# Patient Record
Sex: MALE | Race: WHITE | HISPANIC OR LATINO | URBAN - METROPOLITAN AREA
[De-identification: names, ages, dates, MRNs, and addresses within clinical notes are randomized per-mention and may not be internally consistent; named-entity substitution may affect disease eponyms.]

---

## 2019-01-24 ENCOUNTER — EMERGENCY (EMERGENCY)
Facility: HOSPITAL | Age: 39
LOS: 1 days | Discharge: HOME | End: 2019-01-24
Attending: EMERGENCY MEDICINE
Payer: COMMERCIAL

## 2019-01-24 VITALS — TEMPERATURE: 99 F

## 2019-01-24 VITALS
RESPIRATION RATE: 18 BRPM | DIASTOLIC BLOOD PRESSURE: 80 MMHG | TEMPERATURE: 98 F | HEART RATE: 87 BPM | SYSTOLIC BLOOD PRESSURE: 130 MMHG | WEIGHT: 240.08 LBS | OXYGEN SATURATION: 98 % | HEIGHT: 69 IN

## 2019-01-24 DIAGNOSIS — M77.9 ENTHESOPATHY, UNSPECIFIED: ICD-10-CM

## 2019-01-24 DIAGNOSIS — K80.20 CALCULUS OF GALLBLADDER WITHOUT CHOLECYSTITIS WITHOUT OBSTRUCTION: Chronic | ICD-10-CM

## 2019-01-24 LAB
ALBUMIN SERPL ELPH-MCNC: 4.1 G/DL — SIGNIFICANT CHANGE UP (ref 3.3–5)
ALP SERPL-CCNC: 89 U/L — SIGNIFICANT CHANGE UP (ref 40–120)
ALT FLD-CCNC: 19 U/L — SIGNIFICANT CHANGE UP (ref 10–45)
ANION GAP SERPL CALC-SCNC: 12 MMOL/L — SIGNIFICANT CHANGE UP (ref 5–17)
AST SERPL-CCNC: 13 U/L — SIGNIFICANT CHANGE UP (ref 10–40)
BASOPHILS # BLD AUTO: 0 K/UL — SIGNIFICANT CHANGE UP (ref 0–0.2)
BASOPHILS NFR BLD AUTO: 0.5 % — SIGNIFICANT CHANGE UP (ref 0–2)
BILIRUB SERPL-MCNC: 0.3 MG/DL — SIGNIFICANT CHANGE UP (ref 0.2–1.2)
BUN SERPL-MCNC: 15 MG/DL — SIGNIFICANT CHANGE UP (ref 7–23)
CALCIUM SERPL-MCNC: 9 MG/DL — SIGNIFICANT CHANGE UP (ref 8.4–10.5)
CHLORIDE SERPL-SCNC: 106 MMOL/L — SIGNIFICANT CHANGE UP (ref 96–108)
CO2 SERPL-SCNC: 26 MMOL/L — SIGNIFICANT CHANGE UP (ref 22–31)
CREAT SERPL-MCNC: 1.04 MG/DL — SIGNIFICANT CHANGE UP (ref 0.5–1.3)
EOSINOPHIL # BLD AUTO: 0.1 K/UL — SIGNIFICANT CHANGE UP (ref 0–0.5)
EOSINOPHIL NFR BLD AUTO: 1.3 % — SIGNIFICANT CHANGE UP (ref 0–6)
GLUCOSE SERPL-MCNC: 98 MG/DL — SIGNIFICANT CHANGE UP (ref 70–99)
HCT VFR BLD CALC: 40.8 % — SIGNIFICANT CHANGE UP (ref 39–50)
HGB BLD-MCNC: 14.1 G/DL — SIGNIFICANT CHANGE UP (ref 13–17)
LYMPHOCYTES # BLD AUTO: 1.7 K/UL — SIGNIFICANT CHANGE UP (ref 1–3.3)
LYMPHOCYTES # BLD AUTO: 17.5 % — SIGNIFICANT CHANGE UP (ref 13–44)
MCHC RBC-ENTMCNC: 31.2 PG — SIGNIFICANT CHANGE UP (ref 27–34)
MCHC RBC-ENTMCNC: 34.5 GM/DL — SIGNIFICANT CHANGE UP (ref 32–36)
MCV RBC AUTO: 90.3 FL — SIGNIFICANT CHANGE UP (ref 80–100)
MONOCYTES # BLD AUTO: 0.8 K/UL — SIGNIFICANT CHANGE UP (ref 0–0.9)
MONOCYTES NFR BLD AUTO: 8.8 % — SIGNIFICANT CHANGE UP (ref 2–14)
NEUTROPHILS # BLD AUTO: 6.9 K/UL — SIGNIFICANT CHANGE UP (ref 1.8–7.4)
NEUTROPHILS NFR BLD AUTO: 72 % — SIGNIFICANT CHANGE UP (ref 43–77)
PLATELET # BLD AUTO: 213 K/UL — SIGNIFICANT CHANGE UP (ref 150–400)
POTASSIUM SERPL-MCNC: 3.9 MMOL/L — SIGNIFICANT CHANGE UP (ref 3.5–5.3)
POTASSIUM SERPL-SCNC: 3.9 MMOL/L — SIGNIFICANT CHANGE UP (ref 3.5–5.3)
PROT SERPL-MCNC: 7.2 G/DL — SIGNIFICANT CHANGE UP (ref 6–8.3)
RBC # BLD: 4.52 M/UL — SIGNIFICANT CHANGE UP (ref 4.2–5.8)
RBC # FLD: 13 % — SIGNIFICANT CHANGE UP (ref 10.3–14.5)
SODIUM SERPL-SCNC: 144 MMOL/L — SIGNIFICANT CHANGE UP (ref 135–145)
WBC # BLD: 9.6 K/UL — SIGNIFICANT CHANGE UP (ref 3.8–10.5)
WBC # FLD AUTO: 9.6 K/UL — SIGNIFICANT CHANGE UP (ref 3.8–10.5)

## 2019-01-24 PROCEDURE — 99284 EMERGENCY DEPT VISIT MOD MDM: CPT | Mod: 25

## 2019-01-24 PROCEDURE — 70491 CT SOFT TISSUE NECK W/DYE: CPT

## 2019-01-24 PROCEDURE — 99284 EMERGENCY DEPT VISIT MOD MDM: CPT

## 2019-01-24 PROCEDURE — 96374 THER/PROPH/DIAG INJ IV PUSH: CPT | Mod: XU

## 2019-01-24 PROCEDURE — 80053 COMPREHEN METABOLIC PANEL: CPT

## 2019-01-24 PROCEDURE — 85027 COMPLETE CBC AUTOMATED: CPT

## 2019-01-24 PROCEDURE — 70491 CT SOFT TISSUE NECK W/DYE: CPT | Mod: 26

## 2019-01-24 RX ORDER — DEXAMETHASONE 0.5 MG/5ML
10 ELIXIR ORAL ONCE
Qty: 0 | Refills: 0 | Status: COMPLETED | OUTPATIENT
Start: 2019-01-24 | End: 2019-01-24

## 2019-01-24 RX ORDER — DIAZEPAM 5 MG
5 TABLET ORAL ONCE
Qty: 0 | Refills: 0 | Status: DISCONTINUED | OUTPATIENT
Start: 2019-01-24 | End: 2019-01-24

## 2019-01-24 RX ORDER — IBUPROFEN 200 MG
1 TABLET ORAL
Qty: 20 | Refills: 0
Start: 2019-01-24 | End: 2019-01-28

## 2019-01-24 RX ORDER — IBUPROFEN 200 MG
600 TABLET ORAL ONCE
Qty: 0 | Refills: 0 | Status: COMPLETED | OUTPATIENT
Start: 2019-01-24 | End: 2019-01-24

## 2019-01-24 RX ORDER — OXYCODONE HYDROCHLORIDE 5 MG/1
5 TABLET ORAL ONCE
Qty: 0 | Refills: 0 | Status: DISCONTINUED | OUTPATIENT
Start: 2019-01-24 | End: 2019-01-24

## 2019-01-24 RX ADMIN — Medication 600 MILLIGRAM(S): at 16:15

## 2019-01-24 RX ADMIN — Medication 102 MILLIGRAM(S): at 21:17

## 2019-01-24 RX ADMIN — Medication 600 MILLIGRAM(S): at 15:45

## 2019-01-24 RX ADMIN — OXYCODONE HYDROCHLORIDE 5 MILLIGRAM(S): 5 TABLET ORAL at 17:53

## 2019-01-24 RX ADMIN — Medication 5 MILLIGRAM(S): at 15:45

## 2019-01-24 NOTE — ED ADULT NURSE NOTE - OBJECTIVE STATEMENT
37 yo presents to the ED from home. A&Ox4, ambulatory c/o left sided neck pain associated with pain with swallowing x 3 days. Per patient he began to have left posterior neck pain 3 days ago. pain started after working all day and contributed pain to having bumpy car ride for work. Pain worsened following day and pt saw PCP and was prescribed Motrin 600 q 12 and flexeril 1 tab before bed. Patient states pain was unrelieved and began to have pain with swallowing so saw pcp again today who advised patient to come to ED for further eval. Patient denies fevers, chills, headaches, cold/cough symptoms, chest pain, sob, abdominal pain, n/v/d, urinary symptoms, paresthesias, weakness. VSS. pt well appearing.

## 2019-01-24 NOTE — ED PROVIDER NOTE - PHYSICAL EXAMINATION
CONSTITUTIONAL: Patient is awake, alert and oriented x 3. Patient is well appearing and in no acute distress.  HEAD: NCAT,   EYES: PERRL b/l, EOMI,   ENT: Airway patent, Nasal mucosa clear. Mouth with normal mucosa. Throat has no vesicles, no oropharyngeal exudates and uvula is midline.  NECK: Skin is without rash or lesions. TTP over the left paracervical region onto base of skull, no midline cervical or right para-cervical ttp, no left trapezius ttp. No obvious LAD or ttp in the anterior neck   HEART: RRR.+S1S2 no murmurs,   ABDOMEN: Soft nd/nt+bs no rebound or guarding.   EXTREMITY: no edema or calf tenderness b/l, FROM upper and lower ext b/l  NEURO: Cn3-12 grossly intact. Strength5/5UE/LE.NmlSensation.Gait normal.

## 2019-01-24 NOTE — CONSULT NOTE ADULT - ATTENDING COMMENTS
Patient seen and examined fully by myself, laryngoscopy/endoscopy performed by myself.    A/P: Retropharyngeal Edema probably inflammatory secondary to tendinitis       -decadron 10mg IV x 1        -home with steroid taper and NSAIDS for pain       -f/u with ENT clinic as outpatient [phone]

## 2019-01-24 NOTE — CONSULT NOTE ADULT - ASSESSMENT
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 Pt was started on a muscle relaxant yesterday and motrin and did not find relief. CT reveals Calcific tendinitis of the longus colli muscle. Small amount of prevertebral fluid without peripheral enhancement, likely related to the presence of calcific tendinitis. Laryngoscopy normal, airway widely patent. No concern for infectious process as pt is afebrile and WBC WNL.

## 2019-01-24 NOTE — ED PROVIDER NOTE - PROGRESS NOTE DETAILS
Pt pmd Roque Aden sent pt w/ note to rule out neck mass/abscess w/ ct soft tissue neck given pain unrelieved with nsaid/ muscle relaxer and atypical for muscle spasm given dysphagia   Heidi Huang PA-C CT resulted with calcific tendonitis of the luis antonio longus muscle, spoke to ENT PA who will discuss amd review images to see if diagnosis is in their realm of treatment   Heidi Huang PA-C pt evaluated at bedside by PANCHITO strong. he performed a scope. per dr strong, he does not think this is infectious. he recommends 1 dose IV steroids followed by steroid taper, along with nsaids. pt updated on findings, return precautions, and f/u instructions. to be discharged. - Gilberto Mullen MD

## 2019-01-24 NOTE — ED PROVIDER NOTE - MEDICAL DECISION MAKING DETAILS
Michele: 38 year old male with left sided nedck pain associated with swallowing. was on muscle relaxants and no improvement. pain with opening jaw. + neck spasm on exam, oropharnyx wnl, no swelling of under tongue. will get labs, ct soft tissue, pain control, reassess. no airway compromise. trachea midline. no signs of ludgwig on exam. will ct to r/o retropharyngeal pathology.

## 2019-01-24 NOTE — ED PROVIDER NOTE - CARE PROVIDER_API CALL
Franky Eastman), Otolaryngology  [address] [place] [address]  Phone: [phone]  Fax: [phone]

## 2019-01-24 NOTE — CONSULT NOTE ADULT - PROBLEM SELECTOR RECOMMENDATION 9
1 dose of IV decadron in house  Pt to go home with Steroid taper  Pain control prn  Soft diet as tolerated  Rest  Pt to f/u w PCP

## 2019-01-24 NOTE — ED PROVIDER NOTE - NSFOLLOWUPINSTRUCTIONS_ED_ALL_ED_FT
1. Follow up with your primary care doctor in the next 2-3 days. Bring copies of reports with you  2. Take medrol dose pack as prescribed as well as Motrin 600mg every 6 hours for pain  3. For persistent pain with swallowing follow up with ENT Dr. Eastman, see above  4. Return to ED for change of symptoms including increased pain, worsened issues with swallowing, shortness of breath, fevers and any other symptoms of concern

## 2019-01-24 NOTE — ED PROVIDER NOTE - OBJECTIVE STATEMENT
38 year old male with no sig pmhx presents to ED c/o left sided neck pain associated with pain with swallowing x 3 days. Per patient he began to have left posterior neck pain 3 days ago. pain started after working all day and contributed pain to having bumpy car ride for work. Pain worsened following day and pt saw PCP and was prescribed motrin 600 q 12 and flexeril 1 tab before bed. Patient states pain was unrelieved and began to have pain with swallowing so saw pcp again today who advised patient to come to ED for further eval. Patient denies fevers, chills, headaches, cold/cough symptoms, chest pain, sob, abdominal pain, n/v/d, urinary symptoms, paresthesias, weakness

## 2019-01-24 NOTE — ED ADULT NURSE NOTE - NSIMPLEMENTINTERV_GEN_ALL_ED
Implemented All Universal Safety Interventions:  [place] to call system. Call bell, personal items and telephone within reach. Instruct patient to call for assistance. Room bathroom lighting operational. Non-slip footwear when patient is off stretcher. Physically safe environment: no spills, clutter or unnecessary equipment. Stretcher in lowest position, wheels locked, appropriate side rails in place.

## 2019-01-24 NOTE — CONSULT NOTE ADULT - SUBJECTIVE AND OBJECTIVE BOX
CC: Neck pain    HPI: 38 year old male with no sig pmhx presents to ED c/o left sided neck pain associated with pain with swallowing x 3 days. Per patient he began to have left posterior neck pain 3 days ago. Pain started after working all day and mentions having bumpy car ride for work. Pain worsened following day and pt saw PCP and was prescribed motrin 600 q 12 and flexeril 1 tab before bed. Patient states pain was unrelieved and began to have pain with swallowing so saw pcp again today who advised patient to come to ED for further eval. Patient denies fevers, chills, headaches, cold/cough symptoms, sore throat,  chest pain, sob, n/v/d, urinary symptoms, paresthesias, weakness, vision changes. Pt afebrile. Normal WBC        PAST MEDICAL & SURGICAL HISTORY:  Renal colic  Gall bladder stones    Allergies    No Known Allergies    Intolerances      MEDICATIONS  (STANDING):  dexamethasone  IVPB 10 milliGRAM(s) IV Intermittent Once    MEDICATIONS  (PRN):      Social History: unknown if ever smoked, works as a     Family history:   No pertinent family history in first degree relatives      ROS:   ENT: all negative except as noted in HPI   Skin: No itching, dryness, rash, changes to hair, or skin masses  CV: denies palpitations  Pulm: denies SOB, cough, hemoptysis  GI: denies change in appetite, indigestion, n/v  : denies pertinent urinary symptoms, urgency  Neuro: denies numbness/tingling, loss of sensation  Psych: denies anxiety  MS: denies muscle weakness, instability  Heme: denies easy bruising or bleeding  Endo: denies heat/cold intolerance, excessive sweating  Vascular: denies LE edema    Vital Signs Last 24 Hrs  T(C): 37 (24 Jan 2019 18:56), Max: 37.6 (24 Jan 2019 18:00)  T(F): 98.6 (24 Jan 2019 18:56), Max: 99.6 (24 Jan 2019 18:00)  HR: 78 (24 Jan 2019 18:00) (78 - 87)  BP: 135/93 (24 Jan 2019 18:00) (130/80 - 135/93)  BP(mean): --  RR: 18 (24 Jan 2019 18:00) (18 - 18)  SpO2: 97% (24 Jan 2019 18:00) (97% - 98%)                          14.1   9.6   )-----------( 213      ( 24 Jan 2019 15:43 )             40.8    01-24    144  |  106  |  15  ----------------------------<  98  3.9   |  26  |  1.04    Ca    9.0      24 Jan 2019 15:43    TPro  7.2  /  Alb  4.1  /  TBili  0.3  /  DBili  x   /  AST  13  /  ALT  19  /  AlkPhos  89  01-24       PHYSICAL EXAM:  Gen: NAD  Skin: No rashes, bruises, or lesions  Head: Normocephalic, Atraumatic  Face: no edema, erythema, or fluctuance. Parotid glands soft without mass  Eyes: no scleral injection  Nose: Nares bilaterally patent, no discharge  Mouth: No Stridor / Drooling / Trismus.  Mucosa moist, tongue/uvula midline, oropharynx clear  Neck: Flat, supple, no lymphadenopathy, trachea midline, no masses, + pain with full ROM  Lymphatic: No lymphadenopathy  Resp: breathing easily, no stridor  CV: no peripheral edema/cyanosis  GI: nondistended   Peripheral vascular: no JVD or edema  Neuro: facial nerve intact, no facial droop      Fiberoptic Indirect laryngoscopy:  (Scope #2 used)  Reason for Laryngoscopy: posterior neck pain    Patient was unable to cooperate with mirror.  Nasopharynx, oropharynx, and hypopharynx clear, no bleeding. Tongue base, posterior pharyngeal wall, vallecula, epiglottis, and subglottis appear normal. No erythema, edema, pooling of secretions, masses or lesions. Airway patent, no foreign body visualized. No glottic/supraglottic edema. True vocal cords, arytenoids, vestibular folds, ventricles, pyriform sinuses, and aryepiglottic folds appear normal bilaterally. Vocal cords mobile with good contact b/l.              IMAGING/ADDITIONAL STUDIES:   EXAM: CT NECK SOFT TISSUE IC       PROCEDURE DATE: 01/24/2019           INTERPRETATION: CLINICAL INFORMATION: Posterior neck pain, difficulty   swallowing.     TECHNIQUE: Axial images were obtained following the intravenous   administration of contrast material on 1/24/2019. Sagittal and coronal   reformatted images were obtained. 90 cc Omnipaque 350 were administered. 10   cc were discarded.     COMPARISON: None.     FINDINGS:   Globular calcified deposits are noted anterior to the odontoid process and   anterior arch of C1, consistent with hydroxyapatite deposition disease.   Small amount of prevertebral fluid tracking between the levels of C2 and C5   without peripheral enhancement or fat stranding.     The aerodigestive tract is grossly within normal limits. No tonsillar   enlargement or retropharyngeal fluid collection.     No cervical lymphadenopathy. The parotid and submandibular glands are within   normal limits. The thyroid gland is normal. The visualized paranasal sinuses   and mastoid air cells are clear. The visualized upper lung fields are clear.     The cervical spine demonstrates minimal degenerative spondylosis without   significant disc height loss.     IMPRESSION:   Calcific tendinitis of the longus colli muscle. Small amount of prevertebral   fluid without peripheral enhancement, likely related to the presence of   calcific tendinitis.     No retropharyngeal or peritonsillar abscess.     CRIS JACKSON M.D., RADIOLOGY RESIDENT   This document has been electronically signed.   SILVA HAIDER M.D., ATTENDING RADIOLOGIST   This document has been electronically signed. Jan 24 2019 6:07PM CC: Neck pain    HPI: 38 year old male with no sig pmhx presents to ED c/o left sided neck pain associated with pain with swallowing x 3 days. Per patient he began to have left posterior neck pain 3 days ago. Pain started after working all day and mentions having bumpy car ride for work. Pain worsened following day and pt saw PCP and was prescribed motrin 600 q 12 and flexeril 1 tab before bed. Patient states pain was unrelieved and began to have pain with swallowing so saw pcp again today who advised patient to come to ED for further eval. Patient denies fevers, chills, headaches, cold/cough symptoms, sore throat,  chest pain, sob, n/v/d, urinary symptoms, paresthesias, weakness, vision changes. Pt afebrile. Normal WBC        PAST MEDICAL & SURGICAL HISTORY:  Renal colic  Gall bladder stones    Allergies    No Known Allergies    Intolerances      MEDICATIONS  (STANDING):  dexamethasone  IVPB 10 milliGRAM(s) IV Intermittent Once    MEDICATIONS  (PRN):      Social History: unknown if ever smoked, works as a     Family history:   No pertinent family history in first degree relatives      ROS:   ENT: all negative except as noted in HPI   Skin: No itching, dryness, rash, changes to hair, or skin masses  CV: denies palpitations  Pulm: denies SOB, cough, hemoptysis  GI: denies change in appetite, indigestion, n/v  : denies pertinent urinary symptoms, urgency  Neuro: denies numbness/tingling, loss of sensation  Psych: denies anxiety  MS: denies muscle weakness, instability  Heme: denies easy bruising or bleeding  Endo: denies heat/cold intolerance, excessive sweating  Vascular: denies LE edema    Vital Signs Last 24 Hrs  T(C): 37 (24 Jan 2019 18:56), Max: 37.6 (24 Jan 2019 18:00)  T(F): 98.6 (24 Jan 2019 18:56), Max: 99.6 (24 Jan 2019 18:00)  HR: 78 (24 Jan 2019 18:00) (78 - 87)  BP: 135/93 (24 Jan 2019 18:00) (130/80 - 135/93)  BP(mean): --  RR: 18 (24 Jan 2019 18:00) (18 - 18)  SpO2: 97% (24 Jan 2019 18:00) (97% - 98%)                          14.1   9.6   )-----------( 213      ( 24 Jan 2019 15:43 )             40.8    01-24    144  |  106  |  15  ----------------------------<  98  3.9   |  26  |  1.04    Ca    9.0      24 Jan 2019 15:43    TPro  7.2  /  Alb  4.1  /  TBili  0.3  /  DBili  x   /  AST  13  /  ALT  19  /  AlkPhos  89  01-24       PHYSICAL EXAM:  Gen: NAD  Skin: No rashes, bruises, or lesions  Head: Normocephalic, Atraumatic  Face: no edema, erythema, or fluctuance. Parotid glands soft without mass  Eyes: no scleral injection  Nose: Nares bilaterally patent, no discharge  Nasal/Sinus Endoscopy: max sinus with mucoid fluid b/l via inf meatus, ss clear b/l  Mouth: No Stridor / Drooling / Trismus.  Mucosa moist, tongue/uvula midline, oropharynx clear  Neck: Flat, supple, no lymphadenopathy, trachea midline, no masses, + pain with full ROM  Lymphatic: No lymphadenopathy  Resp: breathing easily, no stridor  CV: no peripheral edema/cyanosis  GI: nondistended   Peripheral vascular: no JVD or edema  Neuro: facial nerve intact, no facial droop      Fiberoptic Indirect laryngoscopy:  (Scope #2 used)  Reason for Laryngoscopy: posterior neck pain    Patient was unable to cooperate with mirror.  Nasopharynx, oropharynx, and hypopharynx clear, no bleeding. Tongue base, posterior pharyngeal wall, vallecula, epiglottis, and subglottis appear normal. No erythema, edema, pooling of secretions, masses or lesions. Airway patent, no foreign body visualized. No glottic/supraglottic edema. True vocal cords, arytenoids, vestibular folds, ventricles, pyriform sinuses, and aryepiglottic folds appear normal bilaterally. Vocal cords mobile with good contact b/l.              IMAGING/ADDITIONAL STUDIES:   EXAM: CT NECK SOFT TISSUE IC       PROCEDURE DATE: 01/24/2019           INTERPRETATION: CLINICAL INFORMATION: Posterior neck pain, difficulty   swallowing.     TECHNIQUE: Axial images were obtained following the intravenous   administration of contrast material on 1/24/2019. Sagittal and coronal   reformatted images were obtained. 90 cc Omnipaque 350 were administered. 10   cc were discarded.     COMPARISON: None.     FINDINGS:   Globular calcified deposits are noted anterior to the odontoid process and   anterior arch of C1, consistent with hydroxyapatite deposition disease.   Small amount of prevertebral fluid tracking between the levels of C2 and C5   without peripheral enhancement or fat stranding.     The aerodigestive tract is grossly within normal limits. No tonsillar   enlargement or retropharyngeal fluid collection.     No cervical lymphadenopathy. The parotid and submandibular glands are within   normal limits. The thyroid gland is normal. The visualized paranasal sinuses   and mastoid air cells are clear. The visualized upper lung fields are clear.     The cervical spine demonstrates minimal degenerative spondylosis without   significant disc height loss.     IMPRESSION:   Calcific tendinitis of the longus colli muscle. Small amount of prevertebral   fluid without peripheral enhancement, likely related to the presence of   calcific tendinitis.     No retropharyngeal or peritonsillar abscess.     CRIS JACKSON M.D., RADIOLOGY RESIDENT   This document has been electronically signed.   SILVA HAIDER M.D., ATTENDING RADIOLOGIST   This document has been electronically signed. Jan 24 2019 6:07PM

## 2021-01-05 ENCOUNTER — TRANSCRIPTION ENCOUNTER (OUTPATIENT)
Age: 41
End: 2021-01-05

## 2021-08-03 ENCOUNTER — TRANSCRIPTION ENCOUNTER (OUTPATIENT)
Age: 41
End: 2021-08-03

## 2021-08-12 ENCOUNTER — INPATIENT (INPATIENT)
Facility: HOSPITAL | Age: 41
LOS: 4 days | Discharge: HOME | DRG: 177 | End: 2021-08-17
Attending: INTERNAL MEDICINE | Admitting: INTERNAL MEDICINE
Payer: COMMERCIAL

## 2021-08-12 VITALS
OXYGEN SATURATION: 98 % | DIASTOLIC BLOOD PRESSURE: 75 MMHG | HEIGHT: 69 IN | RESPIRATION RATE: 18 BRPM | SYSTOLIC BLOOD PRESSURE: 108 MMHG | WEIGHT: 240.08 LBS | HEART RATE: 99 BPM | TEMPERATURE: 99 F

## 2021-08-12 DIAGNOSIS — K80.20 CALCULUS OF GALLBLADDER WITHOUT CHOLECYSTITIS WITHOUT OBSTRUCTION: Chronic | ICD-10-CM

## 2021-08-12 DIAGNOSIS — Z20.822 CONTACT WITH AND (SUSPECTED) EXPOSURE TO COVID-19: ICD-10-CM

## 2021-08-12 LAB
ALBUMIN SERPL ELPH-MCNC: 3.7 G/DL — SIGNIFICANT CHANGE UP (ref 3.3–5)
ALP SERPL-CCNC: 224 U/L — HIGH (ref 40–120)
ALT FLD-CCNC: 334 U/L — HIGH (ref 10–45)
ANION GAP SERPL CALC-SCNC: 15 MMOL/L — SIGNIFICANT CHANGE UP (ref 5–17)
AST SERPL-CCNC: 183 U/L — HIGH (ref 10–40)
BASE EXCESS BLDV CALC-SCNC: 4 MMOL/L — HIGH (ref -2–2)
BASOPHILS # BLD AUTO: 0.02 K/UL — SIGNIFICANT CHANGE UP (ref 0–0.2)
BASOPHILS NFR BLD AUTO: 0.4 % — SIGNIFICANT CHANGE UP (ref 0–2)
BILIRUB SERPL-MCNC: 0.5 MG/DL — SIGNIFICANT CHANGE UP (ref 0.2–1.2)
BUN SERPL-MCNC: 13 MG/DL — SIGNIFICANT CHANGE UP (ref 7–23)
CA-I SERPL-SCNC: 1.13 MMOL/L — SIGNIFICANT CHANGE UP (ref 1.12–1.3)
CALCIUM SERPL-MCNC: 9.1 MG/DL — SIGNIFICANT CHANGE UP (ref 8.4–10.5)
CHLORIDE BLDV-SCNC: 104 MMOL/L — SIGNIFICANT CHANGE UP (ref 96–108)
CHLORIDE SERPL-SCNC: 103 MMOL/L — SIGNIFICANT CHANGE UP (ref 96–108)
CO2 BLDV-SCNC: 32 MMOL/L — HIGH (ref 22–30)
CO2 SERPL-SCNC: 25 MMOL/L — SIGNIFICANT CHANGE UP (ref 22–31)
CREAT SERPL-MCNC: 1.13 MG/DL — SIGNIFICANT CHANGE UP (ref 0.5–1.3)
CRP SERPL-MCNC: 69 MG/L — HIGH (ref 0–4)
D DIMER BLD IA.RAPID-MCNC: 383 NG/ML DDU — HIGH
EOSINOPHIL # BLD AUTO: 0 K/UL — SIGNIFICANT CHANGE UP (ref 0–0.5)
EOSINOPHIL NFR BLD AUTO: 0 % — SIGNIFICANT CHANGE UP (ref 0–6)
GAS PNL BLDV: 139 MMOL/L — SIGNIFICANT CHANGE UP (ref 135–145)
GAS PNL BLDV: SIGNIFICANT CHANGE UP
GLUCOSE BLDV-MCNC: 92 MG/DL — SIGNIFICANT CHANGE UP (ref 70–99)
GLUCOSE SERPL-MCNC: 90 MG/DL — SIGNIFICANT CHANGE UP (ref 70–99)
HCO3 BLDV-SCNC: 31 MMOL/L — HIGH (ref 21–29)
HCT VFR BLD CALC: 47.5 % — SIGNIFICANT CHANGE UP (ref 39–50)
HCT VFR BLDA CALC: 49 % — SIGNIFICANT CHANGE UP (ref 39–50)
HGB BLD CALC-MCNC: 16.1 G/DL — SIGNIFICANT CHANGE UP (ref 13–17)
HGB BLD-MCNC: 15.6 G/DL — SIGNIFICANT CHANGE UP (ref 13–17)
IMM GRANULOCYTES NFR BLD AUTO: 1.6 % — HIGH (ref 0–1.5)
LACTATE BLDV-MCNC: 0.9 MMOL/L — SIGNIFICANT CHANGE UP (ref 0.7–2)
LYMPHOCYTES # BLD AUTO: 0.98 K/UL — LOW (ref 1–3.3)
LYMPHOCYTES # BLD AUTO: 19.7 % — SIGNIFICANT CHANGE UP (ref 13–44)
MCHC RBC-ENTMCNC: 30.1 PG — SIGNIFICANT CHANGE UP (ref 27–34)
MCHC RBC-ENTMCNC: 32.8 GM/DL — SIGNIFICANT CHANGE UP (ref 32–36)
MCV RBC AUTO: 91.5 FL — SIGNIFICANT CHANGE UP (ref 80–100)
MONOCYTES # BLD AUTO: 0.52 K/UL — SIGNIFICANT CHANGE UP (ref 0–0.9)
MONOCYTES NFR BLD AUTO: 10.4 % — SIGNIFICANT CHANGE UP (ref 2–14)
NEUTROPHILS # BLD AUTO: 3.38 K/UL — SIGNIFICANT CHANGE UP (ref 1.8–7.4)
NEUTROPHILS NFR BLD AUTO: 67.9 % — SIGNIFICANT CHANGE UP (ref 43–77)
NRBC # BLD: 0 /100 WBCS — SIGNIFICANT CHANGE UP (ref 0–0)
PCO2 BLDV: 56 MMHG — HIGH (ref 35–50)
PH BLDV: 7.36 — SIGNIFICANT CHANGE UP (ref 7.35–7.45)
PLATELET # BLD AUTO: 149 K/UL — LOW (ref 150–400)
PO2 BLDV: 24 MMHG — LOW (ref 25–45)
POTASSIUM BLDV-SCNC: 3.9 MMOL/L — SIGNIFICANT CHANGE UP (ref 3.5–5.3)
POTASSIUM SERPL-MCNC: 4.1 MMOL/L — SIGNIFICANT CHANGE UP (ref 3.5–5.3)
POTASSIUM SERPL-SCNC: 4.1 MMOL/L — SIGNIFICANT CHANGE UP (ref 3.5–5.3)
PROCALCITONIN SERPL-MCNC: 0.11 NG/ML — HIGH (ref 0.02–0.1)
PROT SERPL-MCNC: 6.9 G/DL — SIGNIFICANT CHANGE UP (ref 6–8.3)
RBC # BLD: 5.19 M/UL — SIGNIFICANT CHANGE UP (ref 4.2–5.8)
RBC # FLD: 13.5 % — SIGNIFICANT CHANGE UP (ref 10.3–14.5)
SAO2 % BLDV: 36 % — LOW (ref 67–88)
SODIUM SERPL-SCNC: 143 MMOL/L — SIGNIFICANT CHANGE UP (ref 135–145)
WBC # BLD: 4.98 K/UL — SIGNIFICANT CHANGE UP (ref 3.8–10.5)
WBC # FLD AUTO: 4.98 K/UL — SIGNIFICANT CHANGE UP (ref 3.8–10.5)

## 2021-08-12 PROCEDURE — 99285 EMERGENCY DEPT VISIT HI MDM: CPT

## 2021-08-12 PROCEDURE — 71045 X-RAY EXAM CHEST 1 VIEW: CPT | Mod: 26

## 2021-08-12 RX ORDER — DEXAMETHASONE 0.5 MG/5ML
6 ELIXIR ORAL DAILY
Refills: 0 | Status: DISCONTINUED | OUTPATIENT
Start: 2021-08-12 | End: 2021-08-13

## 2021-08-12 RX ORDER — ACETAMINOPHEN 500 MG
650 TABLET ORAL ONCE
Refills: 0 | Status: COMPLETED | OUTPATIENT
Start: 2021-08-12 | End: 2021-08-12

## 2021-08-12 RX ORDER — DEXAMETHASONE 0.5 MG/5ML
6 ELIXIR ORAL ONCE
Refills: 0 | Status: COMPLETED | OUTPATIENT
Start: 2021-08-12 | End: 2021-08-12

## 2021-08-12 RX ORDER — ENOXAPARIN SODIUM 100 MG/ML
40 INJECTION SUBCUTANEOUS EVERY 12 HOURS
Refills: 0 | Status: DISCONTINUED | OUTPATIENT
Start: 2021-08-12 | End: 2021-08-17

## 2021-08-12 RX ORDER — SODIUM CHLORIDE 9 MG/ML
500 INJECTION, SOLUTION INTRAVENOUS ONCE
Refills: 0 | Status: COMPLETED | OUTPATIENT
Start: 2021-08-12 | End: 2021-08-12

## 2021-08-12 RX ORDER — ALBUTEROL 90 UG/1
2 AEROSOL, METERED ORAL EVERY 6 HOURS
Refills: 0 | Status: DISCONTINUED | OUTPATIENT
Start: 2021-08-12 | End: 2021-08-17

## 2021-08-12 RX ORDER — REMDESIVIR 5 MG/ML
INJECTION INTRAVENOUS
Refills: 0 | Status: DISCONTINUED | OUTPATIENT
Start: 2021-08-12 | End: 2021-08-13

## 2021-08-12 RX ORDER — PANTOPRAZOLE SODIUM 20 MG/1
40 TABLET, DELAYED RELEASE ORAL
Refills: 0 | Status: DISCONTINUED | OUTPATIENT
Start: 2021-08-12 | End: 2021-08-17

## 2021-08-12 RX ORDER — ACETAMINOPHEN 500 MG
650 TABLET ORAL EVERY 6 HOURS
Refills: 0 | Status: DISCONTINUED | OUTPATIENT
Start: 2021-08-12 | End: 2021-08-17

## 2021-08-12 RX ADMIN — SODIUM CHLORIDE 500 MILLILITER(S): 9 INJECTION, SOLUTION INTRAVENOUS at 20:47

## 2021-08-12 RX ADMIN — Medication 650 MILLIGRAM(S): at 17:07

## 2021-08-12 RX ADMIN — Medication 6 MILLIGRAM(S): at 17:04

## 2021-08-12 NOTE — H&P ADULT - ASSESSMENT
41 m with    COVID 19 Pneumonia  - steroids  - Remdesivir per protocol  - ID evaluation house called  - Pulmonary evaluation Dr. Wilks  - O2 supplementation  - follow inflammatory markers   - AC with Lovenox    Further action as per clinical course     Rakan Rodriguez MD pager [phone]

## 2021-08-12 NOTE — ED PROVIDER NOTE - OBJECTIVE STATEMENT
42yo M no pertinent PMHx who was diagnosed with COVID on tuesday and has had worsening SOB since. Pt endorses that he began experiencing body aches and diarrhea around tuesday morning along with SOB and a cough productive 42yo M no pertinent PMHx who was diagnosed with COVID on tuesday and has had worsening SOB since. Pt endorses that he began experiencing body aches and diarrhea around tuesday morning along with SOB and a productive cough. Pt has since endorsed that his SOB has been worsening with him satting around 92% at home on room air. Pt is stable at bedside, satting well on 3L NC. Pt reports that his Tmax was 100.5 at home. 40yo M no pertinent PMHx who was diagnosed with COVID on tuesday and has had worsening SOB since. Pt endorses that he began experiencing body aches and diarrhea around tuesday morning along with SOB and a productive cough. Pt has since endorsed that his SOB has been worsening with him satting around 92% at home on room air. Pt is stable at bedside, satting well on 1L NC. Pt reports that his Tmax was 100.5 at home. 42yo M no pertinent PMHx who was diagnosed with COVID on tuesday and has had worsening SOB since. Pt endorses that he began experiencing body aches and diarrhea around tuesday morning along with SOB and a productive cough. Pt has since endorsed that his SOB has been worsening with him satting around 92% at home on room air. Pt is stable at bedside, satting well on 1L NC. Pt reports that his Tmax was 100.5 at home.  Attending Alana Garcia: 40 yo male diagnosed wit hcovid 9 days ago presenting with sob and worsening body aches with associated fever. also reports mild episodes of diarrhea. no blood in the stool.

## 2021-08-12 NOTE — H&P ADULT - NSHPPHYSICALEXAM_GEN_ALL_CORE
PHYSICAL EXAMINATION:  Vital Signs Last 24 Hrs  T(C): 36.9 (12 Aug 2021 17:06), Max: 37.2 (12 Aug 2021 13:36)  T(F): 98.4 (12 Aug 2021 17:06), Max: 99 (12 Aug 2021 13:36)  HR: 85 (12 Aug 2021 17:06) (85 - 99)  BP: 114/80 (12 Aug 2021 17:06) (108/75 - 114/80)  BP(mean): --  RR: 18 (12 Aug 2021 17:06) (18 - 18)  SpO2: 96% (12 Aug 2021 17:06) (91% - 96%)  CAPILLARY BLOOD GLUCOSE          GENERAL: NAD, well-groomed, well-developed  HEAD:  atraumatic, normocephalic  EYES: sclera anicteric  ENMT: mucous membranes moist  NECK: supple, No JVD  CHEST/LUNG: few crackles to auscultation bilaterally   HEART: normal S1, S2  ABDOMEN: BS+, soft, ND, NT   EXTREMITIES:  pulses palpable; no clubbing, cyanosis, or edema b/l LEs   NEURO: awake, alert, interactive; moves all extremities  SKIN: no rashes or lesions

## 2021-08-12 NOTE — ED PROVIDER NOTE - PHYSICAL EXAMINATION
General: NAD  HEENT: NCAT, PERRL  Cardiac: RRR, no murmurs, 2+ peripheral pulses  Chest: CTAB  Abdomen: soft, non-distended, bowel sounds present, no ttp, no rebound or guarding  Extremities: no peripheral edema, calf tenderness, or leg size discrepancies  Skin: no rashes  Neuro: AAOx3, 5+motor, sensory grossly intact  Psych: mood and affect appropriate General: NAD  HEENT: NCAT, PERRL  Cardiac: RRR, no murmurs, 2+ peripheral pulses  Chest: CTAB  Abdomen: soft, non-distended, bowel sounds present, no ttp, no rebound or guarding  Extremities: no peripheral edema, calf tenderness, or leg size discrepancies  Skin: no rashes  Neuro: AAOx3, 5+motor, sensory grossly intact  Psych: mood and affect appropriate  Attending Alana Garcia: Gen: nad, heent: atraumatic,  mmm, neck nttp, cv: rrr, lungs ctab, and: soft, nontender nondistended, no peritoneal signs, skin:no rash, neuor awake and alert following commands, ext: wwp, negative homans

## 2021-08-12 NOTE — H&P ADULT - HISTORY OF PRESENT ILLNESS
42yo M no pertinent PMHx who was diagnosed with COVID on tuesday and has had worsening SOB since. Pt endorses that he began experiencing body aches and diarrhea around tuesday morning along with SOB and a productive cough. Pt has since endorsed that his SOB has been worsening with him satting around 92% at home on room air. Pt is stable at bedside, satting well on 1L NC. Pt reports that his Tmax was 100.5 at home.

## 2021-08-12 NOTE — ED PROVIDER NOTE - ATTENDING CONTRIBUTION TO CARE
Attending MD Alana Garcia:  I personally have seen and examined this patient.  Resident note reviewed and agree on plan of care and except where noted.  See HPI, PE, and MDM for details.

## 2021-08-12 NOTE — H&P ADULT - NSHPLABSRESULTS_GEN_ALL_CORE
15.6   4.98  )-----------( 149      ( 12 Aug 2021 17:22 )             47.5       08-12    143  |  103  |  13  ----------------------------<  90  4.1   |  25  |  1.13    Ca    9.1      12 Aug 2021 17:22    TPro  6.9  /  Alb  3.7  /  TBili  0.5  /  DBili  x   /  AST  183<H>  /  ALT  334<H>  /  AlkPhos  224<H>  08-12        < from: Xray Chest 1 View-PORTABLE IMMEDIATE (08.12.21 @ 17:43) >    IMPRESSION:  Nonspecific Patchy peripheral basilar predominant lung opacities, right greater than left.      < end of copied text >    EKG SR

## 2021-08-12 NOTE — ED PROVIDER NOTE - CLINICAL SUMMARY MEDICAL DECISION MAKING FREE TEXT BOX
42yo M presenting for worsening SOB in the setting of COVID. Is clinically stable minus his saturation of 93% on room air. Satting well on NC. Decadron, CXR, labs, and tylenol are ordered. Will f/u 40yo M presenting for worsening SOB in the setting of COVID. Is clinically stable minus his saturation of 93% on room air. Satting well on NC. Decadron, CXR, labs, and tylenol are ordered. Will f/u  Attending Alana Garcia: 40 yo male covid positive presenting with worsening sob. pt nontoxic appearing but is requiring oxygen. will given steroids less likely acute PE at this time as pt without sig respiratory distress. will need admission for oxygen

## 2021-08-12 NOTE — H&P ADULT - NSHPREVIEWOFSYSTEMS_GEN_ALL_CORE
REVIEW OF SYSTEMS:    CONSTITUTIONAL: + weakness,+ fevers + chills  EYES/ENT: No visual changes;  No vertigo or throat pain   NECK: No pain or stiffness  RESPIRATORY: + cough, no wheezing, no hemoptysis; + shortness of breath  CARDIOVASCULAR: No chest pain or palpitations  GASTROINTESTINAL: No abdominal or epigastric pain. No nausea, vomiting, or hematemesis;  + diarrhea or constipation. No melena or hematochezia.  GENITOURINARY: No dysuria, frequency or hematuria  NEUROLOGICAL: No numbness or weakness  SKIN: No itching, burning, rashes, or lesions   All other review of systems is negative unless indicated above.

## 2021-08-13 DIAGNOSIS — U07.1 COVID-19: ICD-10-CM

## 2021-08-13 DIAGNOSIS — R06.02 SHORTNESS OF BREATH: ICD-10-CM

## 2021-08-13 LAB
ALBUMIN SERPL ELPH-MCNC: 3.8 G/DL — SIGNIFICANT CHANGE UP (ref 3.3–5)
ALP SERPL-CCNC: 242 U/L — HIGH (ref 40–120)
ALT FLD-CCNC: 282 U/L — HIGH (ref 10–45)
ANION GAP SERPL CALC-SCNC: 14 MMOL/L — SIGNIFICANT CHANGE UP (ref 5–17)
AST SERPL-CCNC: 120 U/L — HIGH (ref 10–40)
BILIRUB SERPL-MCNC: 0.5 MG/DL — SIGNIFICANT CHANGE UP (ref 0.2–1.2)
BUN SERPL-MCNC: 14 MG/DL — SIGNIFICANT CHANGE UP (ref 7–23)
CALCIUM SERPL-MCNC: 9.1 MG/DL — SIGNIFICANT CHANGE UP (ref 8.4–10.5)
CHLORIDE SERPL-SCNC: 100 MMOL/L — SIGNIFICANT CHANGE UP (ref 96–108)
CO2 SERPL-SCNC: 24 MMOL/L — SIGNIFICANT CHANGE UP (ref 22–31)
COVID-19 SPIKE DOMAIN AB INTERP: NEGATIVE — SIGNIFICANT CHANGE UP
COVID-19 SPIKE DOMAIN ANTIBODY RESULT: 0.4 U/ML — SIGNIFICANT CHANGE UP
CREAT SERPL-MCNC: 0.72 MG/DL — SIGNIFICANT CHANGE UP (ref 0.5–1.3)
FERRITIN SERPL-MCNC: 2641 NG/ML — HIGH (ref 30–400)
GLUCOSE SERPL-MCNC: 177 MG/DL — HIGH (ref 70–99)
HCT VFR BLD CALC: 47.4 % — SIGNIFICANT CHANGE UP (ref 39–50)
HGB BLD-MCNC: 15.9 G/DL — SIGNIFICANT CHANGE UP (ref 13–17)
MCHC RBC-ENTMCNC: 30.1 PG — SIGNIFICANT CHANGE UP (ref 27–34)
MCHC RBC-ENTMCNC: 33.5 GM/DL — SIGNIFICANT CHANGE UP (ref 32–36)
MCV RBC AUTO: 89.8 FL — SIGNIFICANT CHANGE UP (ref 80–100)
MRSA PCR RESULT.: SIGNIFICANT CHANGE UP
NRBC # BLD: 0 /100 WBCS — SIGNIFICANT CHANGE UP (ref 0–0)
PLATELET # BLD AUTO: 178 K/UL — SIGNIFICANT CHANGE UP (ref 150–400)
POTASSIUM SERPL-MCNC: 3.8 MMOL/L — SIGNIFICANT CHANGE UP (ref 3.5–5.3)
POTASSIUM SERPL-SCNC: 3.8 MMOL/L — SIGNIFICANT CHANGE UP (ref 3.5–5.3)
PROT SERPL-MCNC: 7.3 G/DL — SIGNIFICANT CHANGE UP (ref 6–8.3)
RBC # BLD: 5.28 M/UL — SIGNIFICANT CHANGE UP (ref 4.2–5.8)
RBC # FLD: 13.1 % — SIGNIFICANT CHANGE UP (ref 10.3–14.5)
S AUREUS DNA NOSE QL NAA+PROBE: DETECTED
SARS-COV-2 IGG+IGM SERPL QL IA: 0.4 U/ML — SIGNIFICANT CHANGE UP
SARS-COV-2 IGG+IGM SERPL QL IA: NEGATIVE — SIGNIFICANT CHANGE UP
SARS-COV-2 RNA SPEC QL NAA+PROBE: DETECTED
SODIUM SERPL-SCNC: 138 MMOL/L — SIGNIFICANT CHANGE UP (ref 135–145)
WBC # BLD: 3.58 K/UL — LOW (ref 3.8–10.5)
WBC # FLD AUTO: 3.58 K/UL — LOW (ref 3.8–10.5)

## 2021-08-13 PROCEDURE — 99255 IP/OBS CONSLTJ NEW/EST HI 80: CPT

## 2021-08-13 RX ORDER — REMDESIVIR 5 MG/ML
200 INJECTION INTRAVENOUS EVERY 24 HOURS
Refills: 0 | Status: DISCONTINUED | OUTPATIENT
Start: 2021-08-12 | End: 2021-08-13

## 2021-08-13 RX ORDER — CHLORHEXIDINE GLUCONATE 213 G/1000ML
1 SOLUTION TOPICAL
Refills: 0 | Status: DISCONTINUED | OUTPATIENT
Start: 2021-08-13 | End: 2021-08-17

## 2021-08-13 RX ORDER — REMDESIVIR 5 MG/ML
100 INJECTION INTRAVENOUS EVERY 24 HOURS
Refills: 0 | Status: DISCONTINUED | OUTPATIENT
Start: 2021-08-13 | End: 2021-08-13

## 2021-08-13 RX ORDER — REMDESIVIR 5 MG/ML
100 INJECTION INTRAVENOUS EVERY 24 HOURS
Refills: 0 | Status: COMPLETED | OUTPATIENT
Start: 2021-08-14 | End: 2021-08-17

## 2021-08-13 RX ORDER — REMDESIVIR 5 MG/ML
200 INJECTION INTRAVENOUS EVERY 24 HOURS
Refills: 0 | Status: COMPLETED | OUTPATIENT
Start: 2021-08-13 | End: 2021-08-13

## 2021-08-13 RX ORDER — REMDESIVIR 5 MG/ML
INJECTION INTRAVENOUS
Refills: 0 | Status: COMPLETED | OUTPATIENT
Start: 2021-08-13 | End: 2021-08-17

## 2021-08-13 RX ADMIN — Medication 100 MILLIGRAM(S): at 10:17

## 2021-08-13 RX ADMIN — Medication 6 MILLIGRAM(S): at 06:15

## 2021-08-13 RX ADMIN — Medication 100 MILLIGRAM(S): at 18:12

## 2021-08-13 RX ADMIN — ENOXAPARIN SODIUM 40 MILLIGRAM(S): 100 INJECTION SUBCUTANEOUS at 06:16

## 2021-08-13 RX ADMIN — REMDESIVIR 500 MILLIGRAM(S): 5 INJECTION INTRAVENOUS at 12:10

## 2021-08-13 RX ADMIN — CHLORHEXIDINE GLUCONATE 1 APPLICATION(S): 213 SOLUTION TOPICAL at 14:01

## 2021-08-13 RX ADMIN — ENOXAPARIN SODIUM 40 MILLIGRAM(S): 100 INJECTION SUBCUTANEOUS at 18:12

## 2021-08-13 NOTE — CONSULT NOTE ADULT - PROBLEM SELECTOR RECOMMENDATION 9
Reportedly COVID+ outpt 8/10, COVID PCR+ on admission  -Fevers at home, afebrile now   -CXR with b/l lung opacities  -Eventual CT chest  -Remdesivir x 5 days (monitor creatinine & LFTS)  -Currently not requiring O2, O2 sats low 90s on RA  -Start dexamethasone if hypoxic  -Will start Tessalon Perles 100mg q8h for cough   -Trend inflammatory markers  -Keep sats >90% with supplemental O2 as needed  -DVT ppx

## 2021-08-13 NOTE — CONSULT NOTE ADULT - ASSESSMENT
40 yo M with obesity (BMI=35), not vaccinated, acquired COVID-19 (diagnosed on 8/10), had worsening SOB since. Also with body aches, diarrhea, SOB, productive cough. Xjhr=399.5 at home. Currently on RA, 93%. CXR peripheral opacities.     Alanine Aminotransferase (ALT/SGPT): 334 *H* (08-12)  Aspartate Aminotransferase (AST/SGOT): 183 (08-12-21 @ 17:22)  Procalcitonin, Serum: 0.11 (08-12)  C-Reactive Protein, Serum: 69 (08-12)  Ferritin, Serum: 2641 (08-12)  D-Dimer Assay, Quantitative: 383 (08-12)    #COVID-19  - Remdesivir for 5 days  - Trend LDH, CRP, ferritin, D-dimer q48h  - AC and isolation per hospital protocol    #Transaminitis  - Maybe related to COVID-19, but still could be viral hepatitis, MENA, etc  - Can check viral hepatitis panel  - Rest of work up per primary team 40 yo M with obesity (BMI=35), not vaccinated, acquired COVID-19 (diagnosed on 8/10), had worsening SOB since. Also with body aches, diarrhea, SOB, productive cough. Isag=005.5 at home. Currently on RA, 93%. CXR peripheral opacities.     Alanine Aminotransferase (ALT/SGPT): 334 *H* (08-12)  Aspartate Aminotransferase (AST/SGOT): 183 (08-12-21 @ 17:22)  Procalcitonin, Serum: 0.11 (08-12)  C-Reactive Protein, Serum: 69 (08-12)  Ferritin, Serum: 2641 (08-12)  D-Dimer Assay, Quantitative: 383 (08-12)    #COVID-19  - Remdesivir for 5 days, monitor Cr, LFT, hold if LFT uptrending again  - Trend LDH, CRP, ferritin, D-dimer q48h  - AC and isolation per hospital protocol  - Stop dexamethasone    #Transaminitis  - Maybe related to COVID-19, but still could be viral hepatitis, MENA, etc  - Can check viral hepatitis panel  - Rest of work up per primary team    Steven Parker MD, PGY5  ID fellow  Pager: [phone]  [facility] pager ID: [phone] (would prefer to text page for any new consult or question, please include name/location and best call back number)  After 5pm/weekends call [phone]    d/w Dr. MAYLIN Al  Recs conveyed to primary team

## 2021-08-13 NOTE — CONSULT NOTE ADULT - ASSESSMENT
42 y/o M with no reported PMH, COVID+ outpatient 8/10 and now presents with c/o worsening SOB. Also endorses fevers (tmax 100.5 at home) body aches, diarrhea, and productive cough. On triage, afebrile with O2 sats 91% on room air. Repeat COVID CPR +. CXR with b/l lung opacities. Pulmonary called to consult for COVID 19 PNA. Not vaccinated.  40 y/o M with no reported PMH, COVID+ outpatient 8/10 and now presents with c/o worsening SOB. Also endorses fevers (tmax 100.5 at home) body aches, diarrhea, and productive cough. On triage, afebrile with O2 sats 91% on room air. Repeat COVID CPR +. CXR with b/l lung opacities. Pulmonary called to consult for COVID 19 PNA. Not vaccinated. Pt seen on room air, breathing comfortably. Endorses intermittent cough but denies CP, SOB, fever, chills, LE pain/edema.

## 2021-08-13 NOTE — CONSULT NOTE ADULT - SUBJECTIVE AND OBJECTIVE BOX
PULMONARY CONSULT    HPI: 42 y/o M with no reported PMH, COVID+ outpatient 8/10 and now presents with c/o worsening SOB. Also endorses fevers (tmax 100.5 at home) body aches, diarrhea, and productive cough. On triage, afebrile with O2 sats 91% on room air. Repeat COVID CPR +. CXR with b/l lung opacities. Pulmonary called to consult for COVID 19 PNA. Not vaccinated.       PAST MEDICAL & SURGICAL HISTORY:  Renal colic  2019 novel coronavirus disease (COVID-19)  Gall bladder stones    No Known Allergies    FAMILY HISTORY:  No pertinent family history in first degree relatives    Social history:     Review of Systems:  CONSTITUTIONAL: Fevers, body aches  EYES: No eye pain, visual disturbances, or discharge  ENMT:  No difficulty hearing, tinnitus, vertigo; No sinus or throat pain  NECK: No pain or stiffness  RESPIRATORY: Per above  CARDIOVASCULAR: No chest pain, palpitations, dizziness, or leg swelling  GASTROINTESTINAL: +diarrhea  GENITOURINARY: No dysuria, frequency, hematuria, or incontinence  NEUROLOGICAL: No headaches, memory loss, loss of strength, numbness, or tremors  SKIN: No itching, burning, rashes, or lesions   MUSCULOSKELETAL: +body aches  PSYCHIATRIC: No depression, anxiety, mood swings, or difficulty sleeping    Medications:  MEDICATIONS  (STANDING):  benzonatate 100 milliGRAM(s) Oral three times a day  chlorhexidine 2% Cloths 1 Application(s) Topical <User Schedule>  enoxaparin Injectable 40 milliGRAM(s) SubCutaneous every 12 hours  pantoprazole    Tablet 40 milliGRAM(s) Oral before breakfast  remdesivir  IVPB   IV Intermittent   remdesivir  IVPB 200 milliGRAM(s) IV Intermittent every 24 hours    MEDICATIONS  (PRN):  acetaminophen   Tablet .. 650 milliGRAM(s) Oral every 6 hours PRN Temp greater or equal to 38.5C (101.3F), Mild Pain (1 - 3)  ALBUTerol    90 MICROgram(s) HFA Inhaler 2 Puff(s) Inhalation every 6 hours PRN Shortness of Breath and/or Wheezing  guaiFENesin Oral Liquid (Sugar-Free) 200 milliGRAM(s) Oral every 6 hours PRN Cough    Vital Signs Last 24 Hrs  T(C): 36.6 (13 Aug 2021 04:47), Max: 37.2 (12 Aug 2021 13:36)  T(F): 97.8 (13 Aug 2021 04:47), Max: 99 (12 Aug 2021 13:36)  HR: 87 (13 Aug 2021 04:47) (76 - 99)  BP: 129/83 (13 Aug 2021 04:47) (108/75 - 129/83)  BP(mean): --  RR: 18 (13 Aug 2021 04:47) (18 - 18)  SpO2: 93% (13 Aug 2021 04:47) (91% - 96%)      VBG pH 7.36 08-12 @ 17:20  VBG pCO2 56 08-12 @ 17:20  VBG O2 sat 36 08-12 @ 17:20  VBG lactate 0.9 08-12 @ 17:20      LABS:                        15.9   3.58  )-----------( 178      ( 13 Aug 2021 10:38 )             47.4     08-13    138  |  100  |  14  ----------------------------<  177<H>  3.8   |  24  |  0.72    Ca    9.1      13 Aug 2021 10:38    TPro  7.3  /  Alb  3.8  /  TBili  0.5  /  DBili  x   /  AST  120<H>  /  ALT  282<H>  /  AlkPhos  242<H>  08-13    Procalcitonin, Serum: 0.11 ng/mL (08-12-21 @ 17:22)    Physical Examination:    General: No acute distress.      HEENT: Pupils equal, reactive to light.  Symmetric.    PULM:     CVS: RRR    ABD: Soft, nondistended, nontender, normoactive bowel sounds, no masses    EXT: No edema, nontender    SKIN: Warm and well perfused, no rashes noted.    NEURO: Alert, oriented, interactive, nonfocal    RADIOLOGY REVIEWED  CXR: 8/12 b/l lung opacities       PULMONARY CONSULT    HPI: 42 y/o M with no reported PMH, COVID+ outpatient 8/10 and now presents with c/o worsening SOB. Also endorses fevers (tmax 100.5 at home) body aches, diarrhea, and productive cough. On triage, afebrile with O2 sats 91% on room air. Repeat COVID CPR +. CXR with b/l lung opacities. Pulmonary called to consult for COVID 19 PNA. Not vaccinated. Pt seen on room air, breathing comfortably. Endorses intermittent cough but denies CP, SOB, fever, chills, LE pain/edema.     PAST MEDICAL & SURGICAL HISTORY:  Renal colic  2019 novel coronavirus disease (COVID-19)  Gall bladder stones    No Known Allergies    FAMILY HISTORY:  No pertinent family history in first degree relatives    Review of Systems:  CONSTITUTIONAL: Fevers, body aches  EYES: No eye pain, visual disturbances, or discharge  ENMT:  No difficulty hearing, tinnitus, vertigo; No sinus or throat pain  NECK: No pain or stiffness  RESPIRATORY: Per above  CARDIOVASCULAR: No chest pain, palpitations, dizziness, or leg swelling  GASTROINTESTINAL: +diarrhea  GENITOURINARY: No dysuria, frequency, hematuria, or incontinence  NEUROLOGICAL: No headaches, memory loss, loss of strength, numbness, or tremors  SKIN: No itching, burning, rashes, or lesions   MUSCULOSKELETAL: +body aches  PSYCHIATRIC: No depression, anxiety, mood swings, or difficulty sleeping    Medications:  MEDICATIONS  (STANDING):  benzonatate 100 milliGRAM(s) Oral three times a day  chlorhexidine 2% Cloths 1 Application(s) Topical <User Schedule>  enoxaparin Injectable 40 milliGRAM(s) SubCutaneous every 12 hours  pantoprazole    Tablet 40 milliGRAM(s) Oral before breakfast  remdesivir  IVPB   IV Intermittent   remdesivir  IVPB 200 milliGRAM(s) IV Intermittent every 24 hours    MEDICATIONS  (PRN):  acetaminophen   Tablet .. 650 milliGRAM(s) Oral every 6 hours PRN Temp greater or equal to 38.5C (101.3F), Mild Pain (1 - 3)  ALBUTerol    90 MICROgram(s) HFA Inhaler 2 Puff(s) Inhalation every 6 hours PRN Shortness of Breath and/or Wheezing  guaiFENesin Oral Liquid (Sugar-Free) 200 milliGRAM(s) Oral every 6 hours PRN Cough    Vital Signs Last 24 Hrs  T(C): 36.6 (13 Aug 2021 04:47), Max: 37.2 (12 Aug 2021 13:36)  T(F): 97.8 (13 Aug 2021 04:47), Max: 99 (12 Aug 2021 13:36)  HR: 87 (13 Aug 2021 04:47) (76 - 99)  BP: 129/83 (13 Aug 2021 04:47) (108/75 - 129/83)  BP(mean): --  RR: 18 (13 Aug 2021 04:47) (18 - 18)  SpO2: 93% (13 Aug 2021 04:47) (91% - 96%)    VBG pH 7.36 08-12 @ 17:20  VBG pCO2 56 08-12 @ 17:20  VBG O2 sat 36 08-12 @ 17:20  VBG lactate 0.9 08-12 @ 17:20      LABS:                        15.9   3.58  )-----------( 178      ( 13 Aug 2021 10:38 )             47.4     08-13    138  |  100  |  14  ----------------------------<  177<H>  3.8   |  24  |  0.72    Ca    9.1      13 Aug 2021 10:38    TPro  7.3  /  Alb  3.8  /  TBili  0.5  /  DBili  x   /  AST  120<H>  /  ALT  282<H>  /  AlkPhos  242<H>  08-13    Procalcitonin, Serum: 0.11 ng/mL (08-12-21 @ 17:22)    Physical Examination:    General: No acute distress.      HEENT: Pupils equal, reactive to light.  Symmetric.    PULM: Decreased BS     CVS: RRR    ABD: Soft, nondistended, nontender, normoactive bowel sounds, no masses    EXT: No edema, nontender    SKIN: Warm and well perfused, no rashes noted.    NEURO: Alert, oriented, interactive, nonfocal    RADIOLOGY REVIEWED  CXR: 8/12 b/l lung opacities

## 2021-08-13 NOTE — CONSULT NOTE ADULT - SUBJECTIVE AND OBJECTIVE BOX
Patient is a 41y old  Male who presents with a chief complaint of SOB  HPI:  42yo M no pertinent PMHx who was diagnosed with COVID on tuesday and has had worsening SOB since. Pt endorses that he began experiencing body aches and diarrhea around tuesday morning along with SOB and a productive cough. Pt has since endorsed that his SOB has been worsening with him satting around 92% at home on room air. Pt is stable at bedside, satting well on 1L NC. Pt reports that his Tmax was 100.5 at home. (12 Aug 2021 19:12)    Currently on RA, 93%.    REVIEW OF SYSTEMS  [  ] ROS unobtainable because:    [  ] All other systems negative except as noted below    Constitutional:  [ ] fever [ ] chills  [ ] weight loss  [ ]night sweat  [ ]poor appetite/PO intake [ ]fatigue   Skin:  [ ] rash [ ] phlebitis	  Eyes: [ ] icterus [ ] pain  [ ] discharge	  ENMT: [ ] sore throat  [ ] thrush [ ] ulcers [ ] exudates [ ]anosmia  Respiratory: [ ] dyspnea [ ] hemoptysis [ ] cough [ ] sputum	  Cardiovascular:  [ ] chest pain [ ] palpitations [ ] edema	  Gastrointestinal:  [ ] nausea [ ] vomiting [ ] diarrhea [ ] constipation [ ] pain	  Genitourinary:  [ ] dysuria [ ] frequency [ ] hematuria [ ] discharge [ ] flank pain  [ ] incontinence  Musculoskeletal:  [ ] myalgias [ ] arthralgias [ ] arthritis  [ ] back pain  Neurological:  [ ] headache [ ] weakness [ ] seizures  [ ] confusion/altered mental status    prior hospital charts reviewed [V]  primary team notes reviewed [V]  other consultant notes reviewed [V]    PAST MEDICAL & SURGICAL HISTORY:  Renal colic  2019 novel coronavirus disease (COVID-19)  Gall bladder stones    SOCIAL HISTORY:  - Denied smoking/vaping/alcohol/recreational drug use    FAMILY HISTORY:  No pertinent family history in first degree relatives    Allergies  No Known Allergies    ANTIMICROBIALS:  remdesivir  IVPB    remdesivir  IVPB 200 every 24 hours    OTHER MEDS:   MEDICATIONS  (STANDING):  acetaminophen   Tablet .. 650 every 6 hours PRN  ALBUTerol    90 MICROgram(s) HFA Inhaler 2 every 6 hours PRN  benzonatate 100 three times a day  dexAMETHasone  Injectable 6 daily  enoxaparin Injectable 40 every 12 hours  guaiFENesin Oral Liquid (Sugar-Free) 200 every 6 hours PRN  pantoprazole    Tablet 40 before breakfast      VITALS:  Vital Signs Last 24 Hrs  T(F): 97.8 (08-13-21 @ 04:47), Max: 99 (08-12-21 @ 13:36)    Vital Signs Last 24 Hrs  HR: 87 (08-13-21 @ 04:47) (76 - 99)  BP: 129/83 (08-13-21 @ 04:47) (108/75 - 129/83)  RR: 18 (08-13-21 @ 04:47)  SpO2: 93% (08-13-21 @ 04:47) (91% - 96%)  Wt(kg): --    EXAM:  Physical Exam:  Constitutional:  well preserved, comfortable  Head/Eyes: no icterus, PERRL, EOMI  ENT:  supple; no thrush  LUNGS:  CTA  CVS:  normal S1, S2, no murmur  Abd:  soft, non-tender; non-distended  Ext:  no edema  Vascular:  IV site no erythema tenderness or discharge  MSK:  joints without swelling  Neuro: AAO X 3, non- focal    Labs:                        15.6   4.98  )-----------( 149      ( 12 Aug 2021 17:22 )             47.5     08-12    143  |  103  |  13  ----------------------------<  90  4.1   |  25  |  1.13    Ca    9.1      12 Aug 2021 17:22    TPro  6.9  /  Alb  3.7  /  TBili  0.5  /  DBili  x   /  AST  183<H>  /  ALT  334<H>  /  AlkPhos  224<H>  08-12      WBC Trend:  WBC Count: 4.98 (08-12-21 @ 17:22)    Auto Neutrophil #: 3.38 K/uL (08-12-21 @ 17:22)    Creatine Trend:  Creatinine, Serum: 1.13 (08-12)    Liver Biochemical Testing Trend:  Alanine Aminotransferase (ALT/SGPT): 334 *H* (08-12)  Aspartate Aminotransferase (AST/SGOT): 183 (08-12-21 @ 17:22)  Bilirubin Total, Serum: 0.5 (08-12)    MICROBIOLOGY:    COVID-19 PCR: Detected (08-12-21 @ 17:20)    Procalcitonin, Serum: 0.11 (08-12)    C-Reactive Protein, Serum: 69 (08-12)    Ferritin, Serum: 2641 (08-12)    D-Dimer Assay, Quantitative: 383 (08-12)    Blood Gas Venous - Lactate: 0.9 (08-12 @ 17:20)    RADIOLOGY:  imaging below personally reviewed    < from: Xray Chest 1 View-PORTABLE IMMEDIATE (08.12.21 @ 17:43) >  FINDINGS:    Patchy peripheral basilar predominant lung opacities, right greater than left. No pleural effusion or pneumothorax.  Cardiac size is not enlarged. No acute osseous abnormality.    IMPRESSION:  Nonspecific Patchy peripheral basilar predominant lung opacities, right greater than left.    < end of copied text >   Patient is a 41y old  Male who presents with a chief complaint of SOB  HPI:  40yo M no pertinent PMHx who was diagnosed with COVID on tuesday and has had worsening SOB since. Pt endorses that he began experiencing body aches and diarrhea around tuesday morning along with SOB and a productive cough. Pt has since endorsed that his SOB has been worsening with him satting around 92% at home on room air. Pt is stable at bedside, satting well on 1L NC. Pt reports that his Tmax was 100.5 at home. (12 Aug 2021 19:12)    Currently on NC 2L.    All family member including father, sister, mother all not vaccinated, all got covid, father is also now hospitalzed.  When asked pt why he did not get vaccinated, he replied "I am not ready yet".  He would re-think about vaccination after recovering from covid.    REVIEW OF SYSTEMS  [  ] ROS unobtainable because:    [ X ] All other systems negative except as noted below    Constitutional:  [ ] fever [ ] chills  [ ] weight loss  [ ]night sweat  [ ]poor appetite/PO intake [ ]fatigue   Skin:  [ ] rash [ ] phlebitis	  Eyes: [ ] icterus [ ] pain  [ ] discharge	  ENMT: [ ] sore throat  [ ] thrush [ ] ulcers [ ] exudates [no ]anosmia  Respiratory: [X ] dyspnea [ ] hemoptysis [X ] cough [X ] sputum (yellow)	  Cardiovascular:  [ ] chest pain [ ] palpitations [ ] edema	  Gastrointestinal:  [ ] nausea [ ] vomiting [ ] diarrhea [ ] constipation [ ] pain	  Genitourinary:  [ ] dysuria [ ] frequency [ ] hematuria [ ] discharge [ ] flank pain  [ ] incontinence  Musculoskeletal:  [ ] myalgias [ ] arthralgias [ ] arthritis  [ ] back pain  Neurological:  [ ] headache [ ] weakness [ ] seizures  [ ] confusion/altered mental status    prior hospital charts reviewed [V]  primary team notes reviewed [V]  other consultant notes reviewed [V]    PAST MEDICAL & SURGICAL HISTORY:  Renal colic  2019 novel coronavirus disease (COVID-19)  Gall bladder stones    SOCIAL HISTORY:  - Denied smoking/vaping/alcohol/recreational drug use  - Lives with family  - Working as street maintenence    FAMILY HISTORY:  Parents also with COVID    Allergies  No Known Allergies    ANTIMICROBIALS:  remdesivir  IVPB    remdesivir  IVPB 200 every 24 hours    OTHER MEDS:   MEDICATIONS  (STANDING):  acetaminophen   Tablet .. 650 every 6 hours PRN  ALBUTerol    90 MICROgram(s) HFA Inhaler 2 every 6 hours PRN  benzonatate 100 three times a day  dexAMETHasone  Injectable 6 daily  enoxaparin Injectable 40 every 12 hours  guaiFENesin Oral Liquid (Sugar-Free) 200 every 6 hours PRN  pantoprazole    Tablet 40 before breakfast      VITALS:  Vital Signs Last 24 Hrs  T(F): 97.8 (08-13-21 @ 04:47), Max: 99 (08-12-21 @ 13:36)    Vital Signs Last 24 Hrs  HR: 87 (08-13-21 @ 04:47) (76 - 99)  BP: 129/83 (08-13-21 @ 04:47) (108/75 - 129/83)  RR: 18 (08-13-21 @ 04:47)  SpO2: 93% (08-13-21 @ 04:47) (91% - 96%)  Wt(kg): --    EXAM:  Physical Exam:  Constitutional:  well preserved, comfortable, on NC, coughing intermittently  Head/Eyes: no icterus, PERRL, EOMI  ENT:  supple; no thrush  LUNGS:  CTA  CVS:  normal S1, S2, no murmur  Abd:  soft, non-tender; non-distended  Ext:  no edema  Vascular:  IV site no erythema tenderness or discharge  MSK:  joints without swelling  Neuro: AAO X 3, non- focal    Labs:                        15.6   4.98  )-----------( 149      ( 12 Aug 2021 17:22 )             47.5     08-12    143  |  103  |  13  ----------------------------<  90  4.1   |  25  |  1.13    Ca    9.1      12 Aug 2021 17:22    TPro  6.9  /  Alb  3.7  /  TBili  0.5  /  DBili  x   /  AST  183<H>  /  ALT  334<H>  /  AlkPhos  224<H>  08-12      WBC Trend:  WBC Count: 4.98 (08-12-21 @ 17:22)    Auto Neutrophil #: 3.38 K/uL (08-12-21 @ 17:22)    Creatine Trend:  Creatinine, Serum: 1.13 (08-12)    Liver Biochemical Testing Trend:  Alanine Aminotransferase (ALT/SGPT): 334 *H* (08-12)  Aspartate Aminotransferase (AST/SGOT): 183 (08-12-21 @ 17:22)  Bilirubin Total, Serum: 0.5 (08-12)    MICROBIOLOGY:    COVID-19 PCR: Detected (08-12-21 @ 17:20)    Procalcitonin, Serum: 0.11 (08-12)    C-Reactive Protein, Serum: 69 (08-12)    Ferritin, Serum: 2641 (08-12)    D-Dimer Assay, Quantitative: 383 (08-12)    Blood Gas Venous - Lactate: 0.9 (08-12 @ 17:20)    RADIOLOGY:  imaging below personally reviewed    < from: Xray Chest 1 View-PORTABLE IMMEDIATE (08.12.21 @ 17:43) >  FINDINGS:    Patchy peripheral basilar predominant lung opacities, right greater than left. No pleural effusion or pneumothorax.  Cardiac size is not enlarged. No acute osseous abnormality.    IMPRESSION:  Nonspecific Patchy peripheral basilar predominant lung opacities, right greater than left.    < end of copied text >   HPI:  41M diagnosed with COVID Tues 8/10/21, admitted 8/12 with worsening dyspnea and cough.   Pt endorses that he began experiencing body aches and diarrhea around tuesday morning along. Saturating around 92% at home on room air. Pt is stable at bedside, satting well on 1L NC. Pt reports that his Tmax was 100.5 at home. (12 Aug 2021 19:12)    Currently on NC 2L.    All family member including father, sister, mother all not vaccinated, all got covid, father is also now hospitalzed.  When asked pt why he did not get vaccinated, he replied "I am not ready yet".  He would re-think about vaccination after recovering from covid.    REVIEW OF SYSTEMS  [  ] ROS unobtainable because:    [ X ] All other systems negative except as noted below    Constitutional:  [ ] fever [ ] chills  [ ] weight loss  [ ]night sweat  [ ]poor appetite/PO intake [ ]fatigue   Skin:  [ ] rash [ ] phlebitis	  Eyes: [ ] icterus [ ] pain  [ ] discharge	  ENMT: [ ] sore throat  [ ] thrush [ ] ulcers [ ] exudates [no ]anosmia  Respiratory: [X ] dyspnea [ ] hemoptysis [X ] cough [X ] sputum (yellow)	  Cardiovascular:  [ ] chest pain [ ] palpitations [ ] edema	  Gastrointestinal:  [ ] nausea [ ] vomiting [ ] diarrhea [ ] constipation [ ] pain	  Genitourinary:  [ ] dysuria [ ] frequency [ ] hematuria [ ] discharge [ ] flank pain  [ ] incontinence  Musculoskeletal:  [ ] myalgias [ ] arthralgias [ ] arthritis  [ ] back pain  Neurological:  [ ] headache [ ] weakness [ ] seizures  [ ] confusion/altered mental status    prior hospital charts reviewed [V]  primary team notes reviewed [V]  other consultant notes reviewed [V]    PAST MEDICAL & SURGICAL HISTORY:  Renal colic  2019 novel coronavirus disease (COVID-19)  Gall bladder stones    SOCIAL HISTORY:  - Denied smoking/vaping/alcohol/recreational drug use  - Lives with family  - Working as street maintenence    FAMILY HISTORY:  Parents also with COVID    Allergies  No Known Allergies    ANTIMICROBIALS:  remdesivir  IVPB    remdesivir  IVPB 200 every 24 hours    OTHER MEDS:   MEDICATIONS  (STANDING):  acetaminophen   Tablet .. 650 every 6 hours PRN  ALBUTerol    90 MICROgram(s) HFA Inhaler 2 every 6 hours PRN  benzonatate 100 three times a day  dexAMETHasone  Injectable 6 daily  enoxaparin Injectable 40 every 12 hours  guaiFENesin Oral Liquid (Sugar-Free) 200 every 6 hours PRN  pantoprazole    Tablet 40 before breakfast      VITALS:  Vital Signs Last 24 Hrs  T(F): 97.8 (08-13-21 @ 04:47), Max: 99 (08-12-21 @ 13:36)    Vital Signs Last 24 Hrs  HR: 87 (08-13-21 @ 04:47) (76 - 99)  BP: 129/83 (08-13-21 @ 04:47) (108/75 - 129/83)  RR: 18 (08-13-21 @ 04:47)  SpO2: 93% (08-13-21 @ 04:47) (91% - 96%)  Wt(kg): --    EXAM:  Constitutional:  well preserved, comfortable, on NC, coughing intermittently, obese   Head/Eyes: no icterus, PERRL, EOMI  ENT: neck supple   LUNGS:  nonlabored on nasal cannula   CVS:  regular rate   Abd: soft, non-tender; non-distended  Ext:  no edema  Vascular:  IV site no erythema tenderness or discharge  MSK:  joints without swelling  Neuro: AAO X 3, non- focal    Labs:                        15.6   4.98  )-----------( 149      ( 12 Aug 2021 17:22 )             47.5     08-12    143  |  103  |  13  ----------------------------<  90  4.1   |  25  |  1.13    Ca    9.1      12 Aug 2021 17:22    TPro  6.9  /  Alb  3.7  /  TBili  0.5  /  DBili  x   /  AST  183<H>  /  ALT  334<H>  /  AlkPhos  224<H>  08-12      WBC Trend:  WBC Count: 4.98 (08-12-21 @ 17:22)    Auto Neutrophil #: 3.38 K/uL (08-12-21 @ 17:22)    Creatine Trend:  Creatinine, Serum: 1.13 (08-12)    Liver Biochemical Testing Trend:  Alanine Aminotransferase (ALT/SGPT): 334 *H* (08-12)  Aspartate Aminotransferase (AST/SGOT): 183 (08-12-21 @ 17:22)  Bilirubin Total, Serum: 0.5 (08-12)    MICROBIOLOGY:    COVID-19 PCR: Detected (08-12-21 @ 17:20)    Procalcitonin, Serum: 0.11 (08-12)    C-Reactive Protein, Serum: 69 (08-12)    Ferritin, Serum: 2641 (08-12)    D-Dimer Assay, Quantitative: 383 (08-12)    Blood Gas Venous - Lactate: 0.9 (08-12 @ 17:20)    RADIOLOGY:  imaging below personally reviewed  Xray Chest 1 View-PORTABLE IMMEDIATE (08.12.21 @ 17:43)   FINDINGS:    Patchy peripheral basilar predominant lung opacities, right greater than left. No pleural effusion or pneumothorax.  Cardiac size is not enlarged. No acute osseous abnormality.    IMPRESSION:  Nonspecific Patchy peripheral basilar predominant lung opacities, right greater than left.

## 2021-08-13 NOTE — CONSULT NOTE ADULT - ATTENDING COMMENTS
COVID pneumonia  Not vaccinated  His whole family has COVID too   Obese   Agree with Remdesivir - ALT downtrending, no clinical hepatitis, continue to monitor alone with renal function.   I don't think decadron is indicated and would stop.   Supportive care, titrate oxygen to goal 88-94% saturation per protocol.   Rest of care per protocol.     Will follow as needed, call back sooner if change in status.     Ian Al MD   Infectious Disease   Pager [phone]   After 5PM and on weekends please page fellow on call or call [phone]
maintain sat >90% w O2 as needed

## 2021-08-14 LAB
ALBUMIN SERPL ELPH-MCNC: 3.4 G/DL — SIGNIFICANT CHANGE UP (ref 3.3–5)
ALP SERPL-CCNC: 211 U/L — HIGH (ref 40–120)
ALT FLD-CCNC: 280 U/L — HIGH (ref 10–45)
ANION GAP SERPL CALC-SCNC: 14 MMOL/L — SIGNIFICANT CHANGE UP (ref 5–17)
AST SERPL-CCNC: 145 U/L — HIGH (ref 10–40)
BILIRUB SERPL-MCNC: 0.5 MG/DL — SIGNIFICANT CHANGE UP (ref 0.2–1.2)
BUN SERPL-MCNC: 16 MG/DL — SIGNIFICANT CHANGE UP (ref 7–23)
CALCIUM SERPL-MCNC: 9.3 MG/DL — SIGNIFICANT CHANGE UP (ref 8.4–10.5)
CHLORIDE SERPL-SCNC: 103 MMOL/L — SIGNIFICANT CHANGE UP (ref 96–108)
CO2 SERPL-SCNC: 22 MMOL/L — SIGNIFICANT CHANGE UP (ref 22–31)
CREAT SERPL-MCNC: 0.86 MG/DL — SIGNIFICANT CHANGE UP (ref 0.5–1.3)
GLUCOSE SERPL-MCNC: 137 MG/DL — HIGH (ref 70–99)
HCT VFR BLD CALC: 45.8 % — SIGNIFICANT CHANGE UP (ref 39–50)
HGB BLD-MCNC: 15.1 G/DL — SIGNIFICANT CHANGE UP (ref 13–17)
MCHC RBC-ENTMCNC: 29.9 PG — SIGNIFICANT CHANGE UP (ref 27–34)
MCHC RBC-ENTMCNC: 33 GM/DL — SIGNIFICANT CHANGE UP (ref 32–36)
MCV RBC AUTO: 90.7 FL — SIGNIFICANT CHANGE UP (ref 80–100)
NRBC # BLD: 0 /100 WBCS — SIGNIFICANT CHANGE UP (ref 0–0)
PLATELET # BLD AUTO: 230 K/UL — SIGNIFICANT CHANGE UP (ref 150–400)
POTASSIUM SERPL-MCNC: 4.2 MMOL/L — SIGNIFICANT CHANGE UP (ref 3.5–5.3)
POTASSIUM SERPL-SCNC: 4.2 MMOL/L — SIGNIFICANT CHANGE UP (ref 3.5–5.3)
PROT SERPL-MCNC: 6.7 G/DL — SIGNIFICANT CHANGE UP (ref 6–8.3)
RBC # BLD: 5.05 M/UL — SIGNIFICANT CHANGE UP (ref 4.2–5.8)
RBC # FLD: 13.2 % — SIGNIFICANT CHANGE UP (ref 10.3–14.5)
SODIUM SERPL-SCNC: 139 MMOL/L — SIGNIFICANT CHANGE UP (ref 135–145)
WBC # BLD: 9.86 K/UL — SIGNIFICANT CHANGE UP (ref 3.8–10.5)
WBC # FLD AUTO: 9.86 K/UL — SIGNIFICANT CHANGE UP (ref 3.8–10.5)

## 2021-08-14 RX ADMIN — Medication 200 MILLIGRAM(S): at 10:36

## 2021-08-14 RX ADMIN — REMDESIVIR 500 MILLIGRAM(S): 5 INJECTION INTRAVENOUS at 10:36

## 2021-08-14 RX ADMIN — Medication 100 MILLIGRAM(S): at 10:36

## 2021-08-14 RX ADMIN — CHLORHEXIDINE GLUCONATE 1 APPLICATION(S): 213 SOLUTION TOPICAL at 06:32

## 2021-08-14 RX ADMIN — Medication 100 MILLIGRAM(S): at 17:30

## 2021-08-14 RX ADMIN — ENOXAPARIN SODIUM 40 MILLIGRAM(S): 100 INJECTION SUBCUTANEOUS at 17:30

## 2021-08-14 RX ADMIN — PANTOPRAZOLE SODIUM 40 MILLIGRAM(S): 20 TABLET, DELAYED RELEASE ORAL at 06:31

## 2021-08-14 RX ADMIN — Medication 100 MILLIGRAM(S): at 02:05

## 2021-08-14 RX ADMIN — ENOXAPARIN SODIUM 40 MILLIGRAM(S): 100 INJECTION SUBCUTANEOUS at 06:31

## 2021-08-15 LAB
ALBUMIN SERPL ELPH-MCNC: 3.4 G/DL — SIGNIFICANT CHANGE UP (ref 3.3–5)
ALP SERPL-CCNC: 196 U/L — HIGH (ref 40–120)
ALT FLD-CCNC: 294 U/L — HIGH (ref 10–45)
ANION GAP SERPL CALC-SCNC: 16 MMOL/L — SIGNIFICANT CHANGE UP (ref 5–17)
AST SERPL-CCNC: 122 U/L — HIGH (ref 10–40)
BILIRUB SERPL-MCNC: 0.4 MG/DL — SIGNIFICANT CHANGE UP (ref 0.2–1.2)
BUN SERPL-MCNC: 19 MG/DL — SIGNIFICANT CHANGE UP (ref 7–23)
CALCIUM SERPL-MCNC: 9.2 MG/DL — SIGNIFICANT CHANGE UP (ref 8.4–10.5)
CHLORIDE SERPL-SCNC: 105 MMOL/L — SIGNIFICANT CHANGE UP (ref 96–108)
CO2 SERPL-SCNC: 23 MMOL/L — SIGNIFICANT CHANGE UP (ref 22–31)
CREAT SERPL-MCNC: 0.85 MG/DL — SIGNIFICANT CHANGE UP (ref 0.5–1.3)
CRP SERPL-MCNC: 11 MG/L — HIGH (ref 0–4)
D DIMER BLD IA.RAPID-MCNC: 213 NG/ML DDU — SIGNIFICANT CHANGE UP
FERRITIN SERPL-MCNC: 1040 NG/ML — HIGH (ref 30–400)
GLUCOSE SERPL-MCNC: 79 MG/DL — SIGNIFICANT CHANGE UP (ref 70–99)
HCT VFR BLD CALC: 47.8 % — SIGNIFICANT CHANGE UP (ref 39–50)
HGB BLD-MCNC: 15.6 G/DL — SIGNIFICANT CHANGE UP (ref 13–17)
MCHC RBC-ENTMCNC: 29.9 PG — SIGNIFICANT CHANGE UP (ref 27–34)
MCHC RBC-ENTMCNC: 32.6 GM/DL — SIGNIFICANT CHANGE UP (ref 32–36)
MCV RBC AUTO: 91.6 FL — SIGNIFICANT CHANGE UP (ref 80–100)
NRBC # BLD: 0 /100 WBCS — SIGNIFICANT CHANGE UP (ref 0–0)
PLATELET # BLD AUTO: 234 K/UL — SIGNIFICANT CHANGE UP (ref 150–400)
POTASSIUM SERPL-MCNC: 4 MMOL/L — SIGNIFICANT CHANGE UP (ref 3.5–5.3)
POTASSIUM SERPL-SCNC: 4 MMOL/L — SIGNIFICANT CHANGE UP (ref 3.5–5.3)
PROCALCITONIN SERPL-MCNC: 0.06 NG/ML — SIGNIFICANT CHANGE UP (ref 0.02–0.1)
PROT SERPL-MCNC: 6.8 G/DL — SIGNIFICANT CHANGE UP (ref 6–8.3)
RBC # BLD: 5.22 M/UL — SIGNIFICANT CHANGE UP (ref 4.2–5.8)
RBC # FLD: 13.2 % — SIGNIFICANT CHANGE UP (ref 10.3–14.5)
SODIUM SERPL-SCNC: 144 MMOL/L — SIGNIFICANT CHANGE UP (ref 135–145)
WBC # BLD: 7.69 K/UL — SIGNIFICANT CHANGE UP (ref 3.8–10.5)
WBC # FLD AUTO: 7.69 K/UL — SIGNIFICANT CHANGE UP (ref 3.8–10.5)

## 2021-08-15 RX ADMIN — Medication 100 MILLIGRAM(S): at 13:40

## 2021-08-15 RX ADMIN — ENOXAPARIN SODIUM 40 MILLIGRAM(S): 100 INJECTION SUBCUTANEOUS at 17:37

## 2021-08-15 RX ADMIN — Medication 650 MILLIGRAM(S): at 18:22

## 2021-08-15 RX ADMIN — PANTOPRAZOLE SODIUM 40 MILLIGRAM(S): 20 TABLET, DELAYED RELEASE ORAL at 05:23

## 2021-08-15 RX ADMIN — REMDESIVIR 500 MILLIGRAM(S): 5 INJECTION INTRAVENOUS at 11:13

## 2021-08-15 RX ADMIN — Medication 650 MILLIGRAM(S): at 19:00

## 2021-08-15 RX ADMIN — CHLORHEXIDINE GLUCONATE 1 APPLICATION(S): 213 SOLUTION TOPICAL at 05:58

## 2021-08-15 RX ADMIN — ENOXAPARIN SODIUM 40 MILLIGRAM(S): 100 INJECTION SUBCUTANEOUS at 05:23

## 2021-08-15 RX ADMIN — Medication 100 MILLIGRAM(S): at 05:24

## 2021-08-15 RX ADMIN — Medication 100 MILLIGRAM(S): at 21:42

## 2021-08-16 PROCEDURE — 99232 SBSQ HOSP IP/OBS MODERATE 35: CPT

## 2021-08-16 RX ORDER — DEXAMETHASONE 0.5 MG/5ML
6 ELIXIR ORAL DAILY
Refills: 0 | Status: DISCONTINUED | OUTPATIENT
Start: 2021-08-16 | End: 2021-08-17

## 2021-08-16 RX ADMIN — Medication 100 MILLIGRAM(S): at 21:30

## 2021-08-16 RX ADMIN — CHLORHEXIDINE GLUCONATE 1 APPLICATION(S): 213 SOLUTION TOPICAL at 06:05

## 2021-08-16 RX ADMIN — Medication 200 MILLIGRAM(S): at 21:30

## 2021-08-16 RX ADMIN — REMDESIVIR 500 MILLIGRAM(S): 5 INJECTION INTRAVENOUS at 12:00

## 2021-08-16 RX ADMIN — ENOXAPARIN SODIUM 40 MILLIGRAM(S): 100 INJECTION SUBCUTANEOUS at 05:18

## 2021-08-16 RX ADMIN — ENOXAPARIN SODIUM 40 MILLIGRAM(S): 100 INJECTION SUBCUTANEOUS at 17:15

## 2021-08-16 RX ADMIN — Medication 100 MILLIGRAM(S): at 05:18

## 2021-08-16 RX ADMIN — Medication 100 MILLIGRAM(S): at 15:52

## 2021-08-16 RX ADMIN — Medication 650 MILLIGRAM(S): at 12:01

## 2021-08-16 RX ADMIN — Medication 6 MILLIGRAM(S): at 17:15

## 2021-08-16 RX ADMIN — PANTOPRAZOLE SODIUM 40 MILLIGRAM(S): 20 TABLET, DELAYED RELEASE ORAL at 06:05

## 2021-08-17 ENCOUNTER — TRANSCRIPTION ENCOUNTER (OUTPATIENT)
Age: 41
End: 2021-08-17

## 2021-08-17 VITALS — OXYGEN SATURATION: 92 %

## 2021-08-17 LAB
ALBUMIN SERPL ELPH-MCNC: 3.9 G/DL — SIGNIFICANT CHANGE UP (ref 3.3–5)
ALP SERPL-CCNC: 173 U/L — HIGH (ref 40–120)
ALT FLD-CCNC: 233 U/L — HIGH (ref 10–45)
ANION GAP SERPL CALC-SCNC: 14 MMOL/L — SIGNIFICANT CHANGE UP (ref 5–17)
AST SERPL-CCNC: 66 U/L — HIGH (ref 10–40)
BASOPHILS # BLD AUTO: 0.01 K/UL — SIGNIFICANT CHANGE UP (ref 0–0.2)
BASOPHILS NFR BLD AUTO: 0.1 % — SIGNIFICANT CHANGE UP (ref 0–2)
BILIRUB SERPL-MCNC: 0.5 MG/DL — SIGNIFICANT CHANGE UP (ref 0.2–1.2)
BUN SERPL-MCNC: 14 MG/DL — SIGNIFICANT CHANGE UP (ref 7–23)
CALCIUM SERPL-MCNC: 9.5 MG/DL — SIGNIFICANT CHANGE UP (ref 8.4–10.5)
CHLORIDE SERPL-SCNC: 103 MMOL/L — SIGNIFICANT CHANGE UP (ref 96–108)
CO2 SERPL-SCNC: 23 MMOL/L — SIGNIFICANT CHANGE UP (ref 22–31)
CREAT SERPL-MCNC: 0.82 MG/DL — SIGNIFICANT CHANGE UP (ref 0.5–1.3)
EOSINOPHIL # BLD AUTO: 0 K/UL — SIGNIFICANT CHANGE UP (ref 0–0.5)
EOSINOPHIL NFR BLD AUTO: 0 % — SIGNIFICANT CHANGE UP (ref 0–6)
GLUCOSE SERPL-MCNC: 159 MG/DL — HIGH (ref 70–99)
HCT VFR BLD CALC: 48.6 % — SIGNIFICANT CHANGE UP (ref 39–50)
HGB BLD-MCNC: 16.3 G/DL — SIGNIFICANT CHANGE UP (ref 13–17)
IMM GRANULOCYTES NFR BLD AUTO: 0.9 % — SIGNIFICANT CHANGE UP (ref 0–1.5)
LYMPHOCYTES # BLD AUTO: 0.6 K/UL — LOW (ref 1–3.3)
LYMPHOCYTES # BLD AUTO: 6.9 % — LOW (ref 13–44)
MCHC RBC-ENTMCNC: 29.4 PG — SIGNIFICANT CHANGE UP (ref 27–34)
MCHC RBC-ENTMCNC: 33.5 GM/DL — SIGNIFICANT CHANGE UP (ref 32–36)
MCV RBC AUTO: 87.6 FL — SIGNIFICANT CHANGE UP (ref 80–100)
MONOCYTES # BLD AUTO: 0.21 K/UL — SIGNIFICANT CHANGE UP (ref 0–0.9)
MONOCYTES NFR BLD AUTO: 2.4 % — SIGNIFICANT CHANGE UP (ref 2–14)
NEUTROPHILS # BLD AUTO: 7.84 K/UL — HIGH (ref 1.8–7.4)
NEUTROPHILS NFR BLD AUTO: 89.7 % — HIGH (ref 43–77)
NRBC # BLD: 0 /100 WBCS — SIGNIFICANT CHANGE UP (ref 0–0)
PLATELET # BLD AUTO: 341 K/UL — SIGNIFICANT CHANGE UP (ref 150–400)
POTASSIUM SERPL-MCNC: 4.2 MMOL/L — SIGNIFICANT CHANGE UP (ref 3.5–5.3)
POTASSIUM SERPL-SCNC: 4.2 MMOL/L — SIGNIFICANT CHANGE UP (ref 3.5–5.3)
PROT SERPL-MCNC: 7.4 G/DL — SIGNIFICANT CHANGE UP (ref 6–8.3)
RBC # BLD: 5.55 M/UL — SIGNIFICANT CHANGE UP (ref 4.2–5.8)
RBC # FLD: 12.8 % — SIGNIFICANT CHANGE UP (ref 10.3–14.5)
SODIUM SERPL-SCNC: 140 MMOL/L — SIGNIFICANT CHANGE UP (ref 135–145)
WBC # BLD: 8.74 K/UL — SIGNIFICANT CHANGE UP (ref 3.8–10.5)
WBC # FLD AUTO: 8.74 K/UL — SIGNIFICANT CHANGE UP (ref 3.8–10.5)

## 2021-08-17 PROCEDURE — 84132 ASSAY OF SERUM POTASSIUM: CPT

## 2021-08-17 PROCEDURE — 99285 EMERGENCY DEPT VISIT HI MDM: CPT

## 2021-08-17 PROCEDURE — 71045 X-RAY EXAM CHEST 1 VIEW: CPT

## 2021-08-17 PROCEDURE — 82330 ASSAY OF CALCIUM: CPT

## 2021-08-17 PROCEDURE — 85025 COMPLETE CBC W/AUTO DIFF WBC: CPT

## 2021-08-17 PROCEDURE — 80053 COMPREHEN METABOLIC PANEL: CPT

## 2021-08-17 PROCEDURE — 85379 FIBRIN DEGRADATION QUANT: CPT

## 2021-08-17 PROCEDURE — U0003: CPT

## 2021-08-17 PROCEDURE — 82435 ASSAY OF BLOOD CHLORIDE: CPT

## 2021-08-17 PROCEDURE — 96374 THER/PROPH/DIAG INJ IV PUSH: CPT

## 2021-08-17 PROCEDURE — 87641 MR-STAPH DNA AMP PROBE: CPT

## 2021-08-17 PROCEDURE — 86140 C-REACTIVE PROTEIN: CPT

## 2021-08-17 PROCEDURE — 84145 PROCALCITONIN (PCT): CPT

## 2021-08-17 PROCEDURE — 82728 ASSAY OF FERRITIN: CPT

## 2021-08-17 PROCEDURE — 83605 ASSAY OF LACTIC ACID: CPT

## 2021-08-17 PROCEDURE — 85018 HEMOGLOBIN: CPT

## 2021-08-17 PROCEDURE — 84295 ASSAY OF SERUM SODIUM: CPT

## 2021-08-17 PROCEDURE — 87640 STAPH A DNA AMP PROBE: CPT

## 2021-08-17 PROCEDURE — 85014 HEMATOCRIT: CPT

## 2021-08-17 PROCEDURE — U0005: CPT

## 2021-08-17 PROCEDURE — 82803 BLOOD GASES ANY COMBINATION: CPT

## 2021-08-17 PROCEDURE — 82947 ASSAY GLUCOSE BLOOD QUANT: CPT

## 2021-08-17 PROCEDURE — 85027 COMPLETE CBC AUTOMATED: CPT

## 2021-08-17 PROCEDURE — 86769 SARS-COV-2 COVID-19 ANTIBODY: CPT

## 2021-08-17 RX ORDER — IBUPROFEN 200 MG
0 TABLET ORAL
Qty: 0 | Refills: 0 | DISCHARGE

## 2021-08-17 RX ORDER — ACETAMINOPHEN 500 MG
0 TABLET ORAL
Qty: 0 | Refills: 0 | DISCHARGE

## 2021-08-17 RX ORDER — RIVAROXABAN 15 MG-20MG
1 KIT ORAL
Qty: 30 | Refills: 0
Start: 2021-08-17 | End: 2021-09-15

## 2021-08-17 RX ORDER — DEXAMETHASONE 0.5 MG/5ML
1 ELIXIR ORAL
Qty: 3 | Refills: 0
Start: 2021-08-17 | End: 2021-08-19

## 2021-08-17 RX ADMIN — ENOXAPARIN SODIUM 40 MILLIGRAM(S): 100 INJECTION SUBCUTANEOUS at 05:27

## 2021-08-17 RX ADMIN — PANTOPRAZOLE SODIUM 40 MILLIGRAM(S): 20 TABLET, DELAYED RELEASE ORAL at 05:51

## 2021-08-17 RX ADMIN — Medication 100 MILLIGRAM(S): at 05:27

## 2021-08-17 RX ADMIN — REMDESIVIR 500 MILLIGRAM(S): 5 INJECTION INTRAVENOUS at 16:28

## 2021-08-17 RX ADMIN — Medication 6 MILLIGRAM(S): at 05:27

## 2021-08-17 RX ADMIN — Medication 100 MILLIGRAM(S): at 14:46

## 2021-08-17 RX ADMIN — ENOXAPARIN SODIUM 40 MILLIGRAM(S): 100 INJECTION SUBCUTANEOUS at 17:01

## 2021-08-17 NOTE — DISCHARGE NOTE PROVIDER - HOSPITAL COURSE
42 y/o M with no reported PMH, COVID+ outpatient 8/10 and presents with worsening SOB. Also endorses fevers (tmax 100.5 at home) body aches, diarrhea, and productive cough. On triage, afebrile with O2 sats 91% on room air. Repeat COVID CPR positive on admission. Sats low 90s on RA.  Admitted with Pneumonia due to COVID-19 virus.    -CXR with b/l lung opacities  -Remdesivir x 5 days 8/13 - 8/17, Decadron 6mg PO qd x 5 days (started 8/16).  -Tessalon Perles 100mg q8h for cough.  Pt with Elevated LFTS halima in the setting of COVID - Stable on Remdesivir  Afebrile, not requiring Oxygen, saturation on ambulation 95%. 42 y/o M with no reported PMH, COVID+ outpatient 8/10 and presents with worsening SOB. Also endorses fevers (tmax 100.5 at home) body aches, diarrhea, and productive cough. On triage, afebrile with O2 sats 91% on room air. Repeat COVID CPR positive on admission. Sats low 90s on RA.  Admitted with Pneumonia due to COVID-19 virus.    -CXR with b/l lung opacities  -Remdesivir x 5 days 8/13 - 8/17, Decadron 6mg PO qd x 5 days (started 8/16).  -Tessalon Perles 100mg q8h for cough.  Pt with Elevated LFTS halima in the setting of COVID - now downtrending  Afebrile, not requiring Oxygen, saturation on ambulation 95%.

## 2021-08-17 NOTE — DISCHARGE NOTE PROVIDER - NSDCCPCAREPLAN_GEN_ALL_CORE_FT
PRINCIPAL DISCHARGE DIAGNOSIS  Diagnosis: Pneumonia due to COVID-19 virus  Assessment and Plan of Treatment: COVID CPR positive on admission. Sats low 90s on RA.  Admitted with Pneumonia due to COVID-19 virus.    -CXR with b/l lung opacities  You were treated with Remdesivir x 5 days 8/13 - 8/17, Decadron 6mg PO qd x 5 days (started 8/16).  -Tessalon Perles 100mg q8h for cough.  Afebrile, not requiring Oxygen, saturation on ambulation 95%.   You have tested POSITIVE for the novel coronavirus (COVID-19). You no longer require hospitalization.  Follow up with your doctor within 2-3 days.   - Return to the ED for any new or worsening symptoms including but not limited to difficulty breathing, severe weakness, confusion, etc.  - Take Tylenol (Acetaminophen) 650mg as needed for pain or fever  -You will need to wait for 90 days prior to COVID Vaccination  - Stay well hydrated.   - Avoid contact with anybody who is sick OR at risk populations including elderly, young, pregnant patients, immune compromised people  - Wash hands frequently in warm soapy water for at least 20 seconds   - Quarantine yourself at home for at least 2 weeks  - If you would like an appointment to be tested for COVID-19 (coronavirus) at one of the testing sites, contact [phone] for an appointment.    Viral Respiratory Infection  A viral respiratory infection is an illness that affects parts of the body used for breathing, like the lungs, nose, and throat. It is caused by a germ called a virus. Symptoms can include runny nose, coughing, sneezing, fatigue, body aches, sore throat, fever, or headache. Over the counter medicine can be used to manage the symptoms but the infection typically goes away on its own in 5 to 10 days.   SEEK IMMEDIATE MEDICAL CARE IF YOU HAVE ANY OF THE FOLLOWING SYMPTOMS: shortness of breath, chest pain, fever over 10 days, or lightheadedness/dizziness.        SECONDARY DISCHARGE DIAGNOSES  Diagnosis: Transaminitis  Assessment and Plan of Treatment: Pt with Elevated LFTS halima in the setting of COVID - Stable on Remdesivir     PRINCIPAL DISCHARGE DIAGNOSIS  Diagnosis: Pneumonia due to COVID-19 virus  Assessment and Plan of Treatment: COVID CPR positive on admission. Sats low 90s on RA.  Admitted with Pneumonia due to COVID-19 virus.    -CXR with b/l lung opacities  You were treated with Remdesivir x 5 days 8/13 - 8/17, Decadron 6mg PO qd x 5 days (started 8/16).  -Tessalon Perles 100mg q8h for cough.  Afebrile, not requiring Oxygen, saturation on ambulation 95%.   You have tested POSITIVE for the novel coronavirus (COVID-19). You no longer require hospitalization.  Follow up with your doctor within 2-3 days.   - Return to the ED for any new or worsening symptoms including but not limited to difficulty breathing, severe weakness, confusion, etc.  - Take Tylenol (Acetaminophen) 650mg as needed for pain or fever  -You will need to wait for 90 days prior to COVID Vaccination  - Stay well hydrated.   - Avoid contact with anybody who is sick OR at risk populations including elderly, young, pregnant patients, immune compromised people  - Wash hands frequently in warm soapy water for at least 20 seconds   - Quarantine yourself at home for at least 2 weeks  - If you would like an appointment to be tested for COVID-19 (coronavirus) at one of the testing sites, contact [phone] for an appointment.    Viral Respiratory Infection  A viral respiratory infection is an illness that affects parts of the body used for breathing, like the lungs, nose, and throat. It is caused by a germ called a virus. Symptoms can include runny nose, coughing, sneezing, fatigue, body aches, sore throat, fever, or headache. Over the counter medicine can be used to manage the symptoms but the infection typically goes away on its own in 5 to 10 days.   SEEK IMMEDIATE MEDICAL CARE IF YOU HAVE ANY OF THE FOLLOWING SYMPTOMS: shortness of breath, chest pain, fever over 10 days, or lightheadedness/dizziness.        SECONDARY DISCHARGE DIAGNOSES  Diagnosis: Transaminitis  Assessment and Plan of Treatment: Pt with Elevated Liver enzymes likely due to COVID -now coming down  Follow up with PMD - Dr. Koo for follow up blood work in 1 week  Seek medical help for nausea, vomiting, abdominal pain, yellowing of eyes, dark yellow urine, poor appeitite    Diagnosis: DVT prophylaxis  Assessment and Plan of Treatment: You are at risk to form clots in your veins due to COVID infection.   You were placed on blood thinners called Xarelto to prevent clots while in     You are discharged on blood thinner called Xarelto which you continue  for 30 days after discharge from    Seek immediate help for any bleed - nose bleed, blood in sputum, vomiting blood, blood in urine or in stool. Prevent injuries as you are at risk to bleed

## 2021-08-17 NOTE — DISCHARGE NOTE PROVIDER - NSDCMRMEDTOKEN_GEN_ALL_CORE_FT
Motrin:   Tylenol:    benzonatate 100 mg oral capsule: 1 cap(s) orally 3 times a day  dexamethasone 6 mg oral tablet: 1 tab(s) orally once a day x 3 more days  Xarelto 10 mg oral tablet: 1 tab(s) orally once a day

## 2021-08-17 NOTE — PROGRESS NOTE ADULT - PROVIDER SPECIALTY LIST ADULT
Internal Medicine
Pulmonology
Pulmonology

## 2021-08-17 NOTE — PROGRESS NOTE ADULT - PROBLEM SELECTOR PLAN 1
Reportedly COVID+ outpt 8/10, COVID PCR+ on admission  -Fevers at home, afebrile now   -CXR with b/l lung opacities  -Remdesivir x 5 days (monitor creatinine & LFTS), f/u AM LFTs   -Tessalon Perles 100mg q8h for cough   -Trend inflammatory markers  -DVT ppx.  -Sats borderline low on RA yesterday at rest (91%), sats checked with ambulation yesterday (maintained 95%). Briefly on O2 overnight (no documented hypoxia). No pulm objections to d/c planning if sats remain >90% on RA at rest and with ambulation.   -Decadron 6mg PO qd x 5 days (started 8/16)
Reportedly COVID+ outpt 8/10, COVID PCR+ on admission  -Fevers at home, afebrile now   -CXR with b/l lung opacities  -Remdesivir x 5 days (monitor creatinine & LFTS)  -Tessalon Perles 100mg q8h for cough   -Trend inflammatory markers  -DVT ppx.  -Sats borderline low on RA (91%). Check O2 sats on RA with ambulation. Start Decadron 6mg PO x 5 days.
Irina, mother- [phone]

## 2021-08-17 NOTE — PROGRESS NOTE ADULT - ASSESSMENT
41 m with    COVID 19 Pneumonia  - off steroids  - Remdesivir per protocol 4/5  - follow LFT  - ID follow  - Pulmonary follow  - O2 supplementation  - follow inflammatory markers   - AC with Lovenox    DCP home.    Rakan Rodriguez MD pager [phone]     
40 y/o M with no reported PMH, COVID+ outpatient 8/10 and now presents with c/o worsening SOB. Also endorses fevers (tmax 100.5 at home) body aches, diarrhea, and productive cough. On triage, afebrile with O2 sats 91% on room air. Repeat COVID CPR +. CXR with b/l lung opacities. Pulmonary called to consult for COVID 19 PNA. Sats low 90s on RA.
41 m with    COVID 19 Pneumonia  - discontinue steroids  - Remdesivir per protocol  - ID evaluation house noted  - Pulmonary evaluation Dr. Wilks noted  - O2 supplementation  - follow inflammatory markers   - AC with Lovenox    Rakan Rodriguez MD pager [phone]     
41 m with    COVID 19 Pneumonia  - off steroids  - Remdesivir per protocol 5/5  - follow LFT  - ID follow  - Pulmonary follow  - O2 supplementation  - follow inflammatory markers   - AC with Lovenox    DCP home. AC with Xarelto 10 mg po daily for 30 days.    Rakan Rodriguez MD pager [phone]     
41 m with    COVID 19 Pneumonia  - off steroids  - Remdesivir per protocol  - ID evaluation house noted  - Pulmonary evaluation Dr. Wilks noted  - O2 supplementation  - follow inflammatory markers   - AC with Lovenox    Rakan Rodriguez MD pager [phone]     
41 m with    COVID 19 Pneumonia  - off steroids  - Remdesivir per protocol 3/5  - follow LFT  - ID follow  - Pulmonary follow  - O2 supplementation  - follow inflammatory markers   - AC with Lovenox    Rakan Rodriguez MD pager [phone]     
40 y/o M with no reported PMH, COVID+ outpatient 8/10 and now presents with c/o worsening SOB. Also endorses fevers (tmax 100.5 at home) body aches, diarrhea, and productive cough. On triage, afebrile with O2 sats 91% on room air. Repeat COVID CPR +. CXR with b/l lung opacities. Pulmonary called to consult for COVID 19 PNA. Sats low 90s on RA.

## 2021-08-17 NOTE — CHART NOTE - NSCHARTNOTEFT_GEN_A_CORE
Pt now with Downtrending LFTs - pt denies any abdominal pain, nausea, vomiting  Discussed with ID - Dr. Al - Ok for pt to get the Remdesivir    [id number]

## 2021-08-17 NOTE — DISCHARGE NOTE PROVIDER - CARE PROVIDER_API CALL
Roque Koo)  Internal Medicine  [address] [place] [address]  Phone: [phone]  Fax: [phone]  Follow Up Time: 1 week

## 2021-08-17 NOTE — DISCHARGE NOTE NURSING/CASE MANAGEMENT/SOCIAL WORK - PATIENT PORTAL LINK FT
You can access the FollowMyHealth Patient Portal offered by [facility] by registering at the following website: [email or web address]. By joining [email or web address]’s FollowMyHealth portal, you will also be able to view your health information using other applications (apps) compatible with our system.

## 2021-08-17 NOTE — DISCHARGE NOTE PROVIDER - NSDCFUADDINST_GEN_ALL_CORE_FT
Follow up with PMD - Dr. Koo for follow up blood work in 1 week to monitor your liver enzymes  Take discharge paperwork to PMD

## 2021-08-17 NOTE — PROGRESS NOTE ADULT - SUBJECTIVE AND OBJECTIVE BOX
Patient is a 41y old  Male who presents with a chief complaint of COVID (13 Aug 2021 09:02)      SUBJECTIVE / OVERNIGHT EVENTS: Comfortable without new complaints.   Review of Systems  chest pain no  palpitations no  sob improving   nausea no  headache no    MEDICATIONS  (STANDING):  benzonatate 100 milliGRAM(s) Oral three times a day  chlorhexidine 2% Cloths 1 Application(s) Topical <User Schedule>  enoxaparin Injectable 40 milliGRAM(s) SubCutaneous every 12 hours  pantoprazole    Tablet 40 milliGRAM(s) Oral before breakfast  remdesivir  IVPB   IV Intermittent     MEDICATIONS  (PRN):  acetaminophen   Tablet .. 650 milliGRAM(s) Oral every 6 hours PRN Temp greater or equal to 38.5C (101.3F), Mild Pain (1 - 3)  ALBUTerol    90 MICROgram(s) HFA Inhaler 2 Puff(s) Inhalation every 6 hours PRN Shortness of Breath and/or Wheezing  guaiFENesin Oral Liquid (Sugar-Free) 200 milliGRAM(s) Oral every 6 hours PRN Cough      Vital Signs Last 24 Hrs  T(C): 37.2 (13 Aug 2021 17:36), Max: 37.2 (13 Aug 2021 17:36)  T(F): 98.9 (13 Aug 2021 17:36), Max: 98.9 (13 Aug 2021 17:36)  HR: 88 (13 Aug 2021 17:36) (74 - 88)  BP: 131/72 (13 Aug 2021 17:36) (119/83 - 131/72)  BP(mean): --  RR: 18 (13 Aug 2021 17:36) (18 - 18)  SpO2: 92% (13 Aug 2021 17:36) (92% - 94%)    PHYSICAL EXAM:  GENERAL: NAD, well-developed  HEAD:  Atraumatic, Normocephalic  EYES: EOMI, PERRLA, conjunctiva and sclera clear  NECK: Supple, No JVD  CHEST/LUNG: Clear to auscultation bilaterally; No wheeze  HEART: Regular rate and rhythm; No murmurs, rubs, or gallops  ABDOMEN: Soft, Nontender, Nondistended; Bowel sounds present  EXTREMITIES:  2+ Peripheral Pulses, No clubbing, cyanosis, or edema  PSYCH: AAOx3  NEUROLOGY: non-focal  SKIN: No rashes or lesions    LABS:                        15.9   3.58  )-----------( 178      ( 13 Aug 2021 10:38 )             47.4     08-13    138  |  100  |  14  ----------------------------<  177<H>  3.8   |  24  |  0.72    Ca    9.1      13 Aug 2021 10:38    TPro  7.3  /  Alb  3.8  /  TBili  0.5  /  DBili  x   /  AST  120<H>  /  ALT  282<H>  /  AlkPhos  242<H>  08-13                RADIOLOGY & ADDITIONAL TESTS:    Imaging Personally Reviewed:    Consultant(s) Notes Reviewed:      Care Discussed with Consultants/Other Providers:  
Patient is a 41y old  Male who presents with a chief complaint of COVID (13 Aug 2021 09:02)      SUBJECTIVE / OVERNIGHT EVENTS: feels better  Review of Systems  chest pain no  palpitations no  sob improving   nausea no  headache no    MEDICATIONS  (STANDING):  benzonatate 100 milliGRAM(s) Oral three times a day  chlorhexidine 2% Cloths 1 Application(s) Topical <User Schedule>  enoxaparin Injectable 40 milliGRAM(s) SubCutaneous every 12 hours  pantoprazole    Tablet 40 milliGRAM(s) Oral before breakfast  remdesivir  IVPB   IV Intermittent   remdesivir  IVPB 100 milliGRAM(s) IV Intermittent every 24 hours    MEDICATIONS  (PRN):  acetaminophen   Tablet .. 650 milliGRAM(s) Oral every 6 hours PRN Temp greater or equal to 38.5C (101.3F), Mild Pain (1 - 3)  ALBUTerol    90 MICROgram(s) HFA Inhaler 2 Puff(s) Inhalation every 6 hours PRN Shortness of Breath and/or Wheezing  guaiFENesin Oral Liquid (Sugar-Free) 200 milliGRAM(s) Oral every 6 hours PRN Cough      Vital Signs Last 24 Hrs  T(C): 36.5 (14 Aug 2021 11:13), Max: 37.2 (13 Aug 2021 17:36)  T(F): 97.7 (14 Aug 2021 11:13), Max: 98.9 (13 Aug 2021 17:36)  HR: 65 (14 Aug 2021 11:13) (65 - 88)  BP: 118/79 (14 Aug 2021 11:13) (118/79 - 132/88)  BP(mean): --  RR: 18 (14 Aug 2021 11:13) (18 - 18)  SpO2: 94% (14 Aug 2021 11:13) (92% - 94%)    PHYSICAL EXAM:  GENERAL: NAD, well-developed  HEAD:  Atraumatic, Normocephalic  EYES: EOMI, PERRLA, conjunctiva and sclera clear  NECK: Supple, No JVD  CHEST/LUNG: Clear to auscultation bilaterally; No wheeze  HEART: Regular rate and rhythm; No murmurs, rubs, or gallops  ABDOMEN: Soft, Nontender, Nondistended; Bowel sounds present  EXTREMITIES:  2+ Peripheral Pulses, No clubbing, cyanosis, or edema  PSYCH: AAOx3  NEUROLOGY: non-focal  SKIN: No rashes or lesions    LABS:                        15.1   9.86  )-----------( 230      ( 14 Aug 2021 06:34 )             45.8     08-14    139  |  103  |  16  ----------------------------<  137<H>  4.2   |  22  |  0.86    Ca    9.3      14 Aug 2021 06:34    TPro  6.7  /  Alb  3.4  /  TBili  0.5  /  DBili  x   /  AST  145<H>  /  ALT  280<H>  /  AlkPhos  211<H>  08-14                RADIOLOGY & ADDITIONAL TESTS:    Imaging Personally Reviewed:    Consultant(s) Notes Reviewed:      Care Discussed with Consultants/Other Providers:  
Patient is a 41y old  Male who presents with a chief complaint of COVID (13 Aug 2021 09:02)      SUBJECTIVE / OVERNIGHT EVENTS: Comfortable without new complaints.   Review of Systems  chest pain no  palpitations no  sob improving   nausea no  headache no    MEDICATIONS  (STANDING):  benzonatate 100 milliGRAM(s) Oral three times a day  chlorhexidine 2% Cloths 1 Application(s) Topical <User Schedule>  enoxaparin Injectable 40 milliGRAM(s) SubCutaneous every 12 hours  pantoprazole    Tablet 40 milliGRAM(s) Oral before breakfast  remdesivir  IVPB   IV Intermittent   remdesivir  IVPB 100 milliGRAM(s) IV Intermittent every 24 hours    MEDICATIONS  (PRN):  acetaminophen   Tablet .. 650 milliGRAM(s) Oral every 6 hours PRN Temp greater or equal to 38.5C (101.3F), Mild Pain (1 - 3)  ALBUTerol    90 MICROgram(s) HFA Inhaler 2 Puff(s) Inhalation every 6 hours PRN Shortness of Breath and/or Wheezing  guaiFENesin Oral Liquid (Sugar-Free) 200 milliGRAM(s) Oral every 6 hours PRN Cough      Vital Signs Last 24 Hrs  T(C): 36.5 (15 Aug 2021 05:30), Max: 36.5 (14 Aug 2021 11:13)  T(F): 97.7 (15 Aug 2021 05:30), Max: 97.7 (14 Aug 2021 11:13)  HR: 63 (15 Aug 2021 05:30) (60 - 65)  BP: 120/80 (15 Aug 2021 05:30) (118/79 - 125/80)  BP(mean): --  RR: 18 (15 Aug 2021 05:30) (18 - 18)  SpO2: 95% (15 Aug 2021 05:30) (94% - 95%)    PHYSICAL EXAM:  GENERAL: NAD, well-developed  HEAD:  Atraumatic, Normocephalic  EYES: EOMI, PERRLA, conjunctiva and sclera clear  NECK: Supple, No JVD  CHEST/LUNG: Clear to auscultation bilaterally; No wheeze  HEART: Regular rate and rhythm; No murmurs, rubs, or gallops  ABDOMEN: Soft, Nontender, Nondistended; Bowel sounds present  EXTREMITIES:  2+ Peripheral Pulses, No clubbing, cyanosis, or edema  PSYCH: AAOx3  NEUROLOGY: non-focal  SKIN: No rashes or lesions    LABS:                        15.6   7.69  )-----------( 234      ( 15 Aug 2021 06:43 )             47.8     08-15    144  |  105  |  19  ----------------------------<  79  4.0   |  23  |  0.85    Ca    9.2      15 Aug 2021 06:43    TPro  6.8  /  Alb  3.4  /  TBili  0.4  /  DBili  x   /  AST  122<H>  /  ALT  294<H>  /  AlkPhos  196<H>  08-15                RADIOLOGY & ADDITIONAL TESTS:    Imaging Personally Reviewed:    Consultant(s) Notes Reviewed:      Care Discussed with Consultants/Other Providers:  
Patient is a 41y old  Male who presents with a chief complaint of COVID (13 Aug 2021 09:02)      SUBJECTIVE / OVERNIGHT EVENTS: Comfortable without new complaints.   Review of Systems  chest pain no  palpitations no  sob improving   nausea no  headache no    MEDICATIONS  (STANDING):  benzonatate 100 milliGRAM(s) Oral three times a day  chlorhexidine 2% Cloths 1 Application(s) Topical <User Schedule>  dexAMETHasone     Tablet 6 milliGRAM(s) Oral daily  enoxaparin Injectable 40 milliGRAM(s) SubCutaneous every 12 hours  pantoprazole    Tablet 40 milliGRAM(s) Oral before breakfast    MEDICATIONS  (PRN):  acetaminophen   Tablet .. 650 milliGRAM(s) Oral every 6 hours PRN Temp greater or equal to 38.5C (101.3F), Mild Pain (1 - 3)  ALBUTerol    90 MICROgram(s) HFA Inhaler 2 Puff(s) Inhalation every 6 hours PRN Shortness of Breath and/or Wheezing  guaiFENesin Oral Liquid (Sugar-Free) 200 milliGRAM(s) Oral every 6 hours PRN Cough      Vital Signs Last 24 Hrs  T(C): 36.8 (17 Aug 2021 10:46), Max: 36.9 (16 Aug 2021 20:53)  T(F): 98.2 (17 Aug 2021 10:46), Max: 98.5 (16 Aug 2021 20:53)  HR: 91 (17 Aug 2021 10:46) (68 - 91)  BP: 126/83 (17 Aug 2021 10:46) (118/77 - 126/83)  BP(mean): --  RR: 18 (17 Aug 2021 10:46) (18 - 18)  SpO2: 92% (17 Aug 2021 16:00) (90% - 94%)    PHYSICAL EXAM:  GENERAL: NAD, well-developed  HEAD:  Atraumatic, Normocephalic  EYES: EOMI, PERRLA, conjunctiva and sclera clear  NECK: Supple, No JVD  CHEST/LUNG: Clear to auscultation bilaterally; No wheeze  HEART: Regular rate and rhythm; No murmurs, rubs, or gallops  ABDOMEN: Soft, Nontender, Nondistended; Bowel sounds present  EXTREMITIES:  2+ Peripheral Pulses, No clubbing, cyanosis, or edema  PSYCH: AAOx3  NEUROLOGY: non-focal  SKIN: No rashes or lesions    LABS:                        16.3   8.74  )-----------( 341      ( 17 Aug 2021 12:12 )             48.6     08-17    140  |  103  |  14  ----------------------------<  159<H>  4.2   |  23  |  0.82    Ca    9.5      17 Aug 2021 12:12    TPro  7.4  /  Alb  3.9  /  TBili  0.5  /  DBili  x   /  AST  66<H>  /  ALT  233<H>  /  AlkPhos  173<H>  08-17                RADIOLOGY & ADDITIONAL TESTS:    Imaging Personally Reviewed:    Consultant(s) Notes Reviewed:      Care Discussed with Consultants/Other Providers:  
Patient is a 41y old  Male who presents with a chief complaint of COVID (13 Aug 2021 09:02)      SUBJECTIVE / OVERNIGHT EVENTS: Comfortable without new complaints.   Review of Systems  chest pain no  palpitations no  sob improving   nausea no  headache no    MEDICATIONS  (STANDING):  benzonatate 100 milliGRAM(s) Oral three times a day  chlorhexidine 2% Cloths 1 Application(s) Topical <User Schedule>  dexAMETHasone     Tablet 6 milliGRAM(s) Oral daily  enoxaparin Injectable 40 milliGRAM(s) SubCutaneous every 12 hours  pantoprazole    Tablet 40 milliGRAM(s) Oral before breakfast  remdesivir  IVPB   IV Intermittent   remdesivir  IVPB 100 milliGRAM(s) IV Intermittent every 24 hours    MEDICATIONS  (PRN):  acetaminophen   Tablet .. 650 milliGRAM(s) Oral every 6 hours PRN Temp greater or equal to 38.5C (101.3F), Mild Pain (1 - 3)  ALBUTerol    90 MICROgram(s) HFA Inhaler 2 Puff(s) Inhalation every 6 hours PRN Shortness of Breath and/or Wheezing  guaiFENesin Oral Liquid (Sugar-Free) 200 milliGRAM(s) Oral every 6 hours PRN Cough      Vital Signs Last 24 Hrs  T(C): 36.9 (16 Aug 2021 20:53), Max: 36.9 (16 Aug 2021 20:53)  T(F): 98.5 (16 Aug 2021 20:53), Max: 98.5 (16 Aug 2021 20:53)  HR: 68 (16 Aug 2021 20:53) (68 - 83)  BP: 121/78 (16 Aug 2021 20:53) (107/71 - 121/78)  BP(mean): --  RR: 18 (16 Aug 2021 20:53) (18 - 19)  SpO2: 94% (16 Aug 2021 20:53) (91% - 95%)    PHYSICAL EXAM:  GENERAL: NAD, well-developed  HEAD:  Atraumatic, Normocephalic  EYES: EOMI, PERRLA, conjunctiva and sclera clear  NECK: Supple, No JVD  CHEST/LUNG: Clear to auscultation bilaterally; No wheeze  HEART: Regular rate and rhythm; No murmurs, rubs, or gallops  ABDOMEN: Soft, Nontender, Nondistended; Bowel sounds present  EXTREMITIES:  2+ Peripheral Pulses, No clubbing, cyanosis, or edema  PSYCH: AAOx3  NEUROLOGY: non-focal  SKIN: No rashes or lesions    LABS:                        15.6   7.69  )-----------( 234      ( 15 Aug 2021 06:43 )             47.8     08-15    144  |  105  |  19  ----------------------------<  79  4.0   |  23  |  0.85    Ca    9.2      15 Aug 2021 06:43    TPro  6.8  /  Alb  3.4  /  TBili  0.4  /  DBili  x   /  AST  122<H>  /  ALT  294<H>  /  AlkPhos  196<H>  08-15                RADIOLOGY & ADDITIONAL TESTS:    Imaging Personally Reviewed:    Consultant(s) Notes Reviewed:      Care Discussed with Consultants/Other Providers:  
Follow-up Pulm Progress Note    No new respiratory events overnight  Sats 91% RA at rest     Medications:  MEDICATIONS  (STANDING):  benzonatate 100 milliGRAM(s) Oral three times a day  chlorhexidine 2% Cloths 1 Application(s) Topical <User Schedule>  enoxaparin Injectable 40 milliGRAM(s) SubCutaneous every 12 hours  pantoprazole    Tablet 40 milliGRAM(s) Oral before breakfast  remdesivir  IVPB   IV Intermittent   remdesivir  IVPB 100 milliGRAM(s) IV Intermittent every 24 hours    MEDICATIONS  (PRN):  acetaminophen   Tablet .. 650 milliGRAM(s) Oral every 6 hours PRN Temp greater or equal to 38.5C (101.3F), Mild Pain (1 - 3)  ALBUTerol    90 MICROgram(s) HFA Inhaler 2 Puff(s) Inhalation every 6 hours PRN Shortness of Breath and/or Wheezing  guaiFENesin Oral Liquid (Sugar-Free) 200 milliGRAM(s) Oral every 6 hours PRN Cough          Vital Signs Last 24 Hrs  T(C): 36.8 (16 Aug 2021 11:47), Max: 37.1 (15 Aug 2021 20:51)  T(F): 98.2 (16 Aug 2021 11:47), Max: 98.8 (15 Aug 2021 20:51)  HR: 79 (16 Aug 2021 11:47) (75 - 83)  BP: 107/71 (16 Aug 2021 11:47) (107/71 - 118/76)  BP(mean): --  RR: 19 (16 Aug 2021 11:47) (19 - 20)  SpO2: 91% (16 Aug 2021 11:47) (90% - 93%)          08-15 @ 07:01  -  08-16 @ 07:00  --------------------------------------------------------  IN: 240 mL / OUT: 0 mL / NET: 240 mL          LABS:                        15.6   7.69  )-----------( 234      ( 15 Aug 2021 06:43 )             47.8     08-15    144  |  105  |  19  ----------------------------<  79  4.0   |  23  |  0.85    Ca    9.2      15 Aug 2021 06:43    TPro  6.8  /  Alb  3.4  /  TBili  0.4  /  DBili  x   /  AST  122<H>  /  ALT  294<H>  /  AlkPhos  196<H>  08-15        Procalcitonin, Serum: 0.06 ng/mL (08-15-21 @ 06:43)          Physical Examination:  PULM: decreased BS  CVS: S1, S2 heard    RADIOLOGY REVIEWED  CXR 8/12: bibasilar opacities R>L
Follow-up Pulm Progress Note    Noted to be on 2LNC overnight, pt states "he felt better with it on" but denies SOB/cough/CP      Medications:  MEDICATIONS  (STANDING):  benzonatate 100 milliGRAM(s) Oral three times a day  chlorhexidine 2% Cloths 1 Application(s) Topical <User Schedule>  dexAMETHasone     Tablet 6 milliGRAM(s) Oral daily  enoxaparin Injectable 40 milliGRAM(s) SubCutaneous every 12 hours  pantoprazole    Tablet 40 milliGRAM(s) Oral before breakfast  remdesivir  IVPB   IV Intermittent   remdesivir  IVPB 100 milliGRAM(s) IV Intermittent every 24 hours    MEDICATIONS  (PRN):  acetaminophen   Tablet .. 650 milliGRAM(s) Oral every 6 hours PRN Temp greater or equal to 38.5C (101.3F), Mild Pain (1 - 3)  ALBUTerol    90 MICROgram(s) HFA Inhaler 2 Puff(s) Inhalation every 6 hours PRN Shortness of Breath and/or Wheezing  guaiFENesin Oral Liquid (Sugar-Free) 200 milliGRAM(s) Oral every 6 hours PRN Cough          Vital Signs Last 24 Hrs  T(C): 36.7 (17 Aug 2021 04:09), Max: 36.9 (16 Aug 2021 20:53)  T(F): 98 (17 Aug 2021 04:09), Max: 98.5 (16 Aug 2021 20:53)  HR: 78 (17 Aug 2021 04:09) (68 - 79)  BP: 118/77 (17 Aug 2021 04:09) (107/71 - 121/78)  BP(mean): --  RR: 18 (17 Aug 2021 04:09) (18 - 19)  SpO2: 94% (17 Aug 2021 04:09) (91% - 95%)          08-16 @ 07:01  -  08-17 @ 07:00  --------------------------------------------------------  IN: 480 mL / OUT: 0 mL / NET: 480 mL          Procalcitonin, Serum: 0.06 ng/mL (08-15-21 @ 06:43)            Physical Examination:  PULM: trace crackles L base   CVS: S1, S2 heard    RADIOLOGY REVIEWED  CXR 8/12: bibasilar opacities R>L

## 2021-08-20 ENCOUNTER — TRANSCRIPTION ENCOUNTER (OUTPATIENT)
Age: 41
End: 2021-08-20

## 2021-10-15 PROBLEM — Z00.00 ENCOUNTER FOR PREVENTIVE HEALTH EXAMINATION: Status: ACTIVE | Noted: 2021-10-15

## 2021-10-16 PROBLEM — U07.1 COVID-19: Chronic | Status: ACTIVE | Noted: 2021-08-12

## 2021-10-19 ENCOUNTER — APPOINTMENT (OUTPATIENT)
Dept: RADIOLOGY | Facility: CLINIC | Age: 41
End: 2021-10-19
Payer: COMMERCIAL

## 2021-10-19 ENCOUNTER — NON-APPOINTMENT (OUTPATIENT)
Age: 41
End: 2021-10-19

## 2021-10-19 ENCOUNTER — APPOINTMENT (OUTPATIENT)
Dept: INTERNAL MEDICINE | Facility: CLINIC | Age: 41
End: 2021-10-19
Payer: COMMERCIAL

## 2021-10-19 VITALS
HEART RATE: 92 BPM | DIASTOLIC BLOOD PRESSURE: 85 MMHG | SYSTOLIC BLOOD PRESSURE: 118 MMHG | TEMPERATURE: 97.6 F | BODY MASS INDEX: 35.55 KG/M2 | HEIGHT: 69 IN | OXYGEN SATURATION: 97 % | WEIGHT: 240 LBS

## 2021-10-19 DIAGNOSIS — Z87.442 PERSONAL HISTORY OF URINARY CALCULI: ICD-10-CM

## 2021-10-19 DIAGNOSIS — Z82.49 FAMILY HISTORY OF ISCHEMIC HEART DISEASE AND OTHER DISEASES OF THE CIRCULATORY SYSTEM: ICD-10-CM

## 2021-10-19 PROCEDURE — 99072 ADDL SUPL MATRL&STAF TM PHE: CPT

## 2021-10-19 PROCEDURE — 99386 PREV VISIT NEW AGE 40-64: CPT | Mod: 25

## 2021-10-19 PROCEDURE — 71046 X-RAY EXAM CHEST 2 VIEWS: CPT

## 2021-10-19 PROCEDURE — 93000 ELECTROCARDIOGRAM COMPLETE: CPT

## 2021-10-19 NOTE — HEALTH RISK ASSESSMENT
[No] : In the past 12 months have you used drugs other than those required for medical reasons? No [0] : 2) Feeling down, depressed, or hopeless: Not at all (0) [PHQ-2 Negative - No further assessment needed] : PHQ-2 Negative - No further assessment needed [Alone] : lives alone [Retired] : retired [Fully functional (bathing, dressing, toileting, transferring, walking, feeding)] : Fully functional (bathing, dressing, toileting, transferring, walking, feeding) [Fully functional (using the telephone, shopping, preparing meals, housekeeping, doing laundry, using] : Fully functional and needs no help or supervision to perform IADLs (using the telephone, shopping, preparing meals, housekeeping, doing laundry, using transportation, managing medications and managing finances) [Yes] : Yes [2 - 4 times a month (2 pts)] : 2-4 times a month (2 points) [3 or 4 (1 pt)] : 3 or 4  (1 point) [] : No [de-identified] : no regular exercise [[id number]] : 0 [Employed] : employed [de-identified] : recently retired

## 2021-10-19 NOTE — HISTORY OF PRESENT ILLNESS
[FreeTextEntry1] : Had COVID Aug 2021, here for f/u, needs new PCP due to insurance [de-identified] : 40 yo M had COVID Aug 2021, symptoms of myalgias, headache, then SOB,  his entire family also infected, father  at this time (age 71), here c/o funny feeling in his chest since being home, cannot explain this, not a pain, not sharp, not pleuritic, feels like it could become a tightness if it got worse, it doesn’t. This is not related to activity, comes and goes, lasts about 1-2 hours, seems to be coming on more frequently lately. Feels like he got a lot of conflicting info during his COVID hospitalization, about VTE, about his liver (had elevated LFTs) and the vaccine. \par Pt not vaccinated, not sure about this. States in hospital was told to wait 3-6 by several nurses and others, and then offered vaccine by RN on his d/c day so this confused him. \par

## 2021-10-19 NOTE — PHYSICAL EXAM
[Normal] : affect was normal and insight and judgment were intact [de-identified] : no RUQ tenderness

## 2021-10-31 NOTE — ED PROVIDER NOTE - GASTROINTESTINAL NEGATIVE STATEMENT, MLM
no abdominal pain, no bloating, no constipation, no diarrhea, no nausea and no vomiting. 01-Nov-2021 00:43

## 2021-11-10 ENCOUNTER — APPOINTMENT (OUTPATIENT)
Dept: CARDIOLOGY | Facility: CLINIC | Age: 41
End: 2021-11-10
Payer: COMMERCIAL

## 2021-11-10 VITALS
BODY MASS INDEX: 36.14 KG/M2 | HEIGHT: 69 IN | HEART RATE: 77 BPM | TEMPERATURE: 97.3 F | DIASTOLIC BLOOD PRESSURE: 77 MMHG | SYSTOLIC BLOOD PRESSURE: 115 MMHG | OXYGEN SATURATION: 95 % | WEIGHT: 244 LBS

## 2021-11-10 DIAGNOSIS — R07.89 OTHER CHEST PAIN: ICD-10-CM

## 2021-11-10 PROCEDURE — 99204 OFFICE O/P NEW MOD 45 MIN: CPT

## 2021-11-10 NOTE — REASON FOR VISIT
[Initial Evaluation] : an initial evaluation of [FreeTextEntry2] : chest pain  [FreeTextEntry1] : 41 year old man, s/p COVID, hospitalized for this.  here for cardiac eval\par \par Had Covid in August, admitted to [facility], was there for 6 days, got better.  Was given remdesivir.  Since then he has been having chest pains in the left chest wall.  Does not feel normal.  Worse with activity.  \par Sometimes he feels a one second poke in the chest.  \par This has been stable. \par No palpitations\par \par No cardiac testing.\par \par No meds at home\par \par family history :   HTN both parents\par \par He works night shifts.\par ECG:  Oct 2021: within normal limits\par Father passed with COVID - at age 71.\par \par

## 2021-11-10 NOTE — ASSESSMENT
[FreeTextEntry1] : Assessment:\par 1.  Chest pain\par 2.  Recent covid\par 3.  Overweight\par \par \par Plan:\par 1.  Labwork today\par 2.  Echocardiogram\par 3.  Treadmill exercise stress test\par 4.  Labs today including A1C (overweight) lipid panel (overweight), Dimer (recent covid and cp), BNP\par 5.  Await testing\par 6.  He needs to start losing weight and exercising. \par

## 2021-11-10 NOTE — PHYSICAL EXAM
[General Appearance - Well Developed] : well developed [Normal Appearance] : normal appearance [Well Groomed] : well groomed [General Appearance - Well Nourished] : well nourished [No Deformities] : no deformities [General Appearance - In No Acute Distress] : no acute distress [Normal Conjunctiva] : the conjunctiva exhibited no abnormalities [Eyelids - No Xanthelasma] : the eyelids demonstrated no xanthelasmas [Normal Oral Mucosa] : normal oral mucosa [No Oral Pallor] : no oral pallor [No Oral Cyanosis] : no oral cyanosis [Normal Jugular Venous A Waves Present] : normal jugular venous A waves present [Normal Jugular Venous V Waves Present] : normal jugular venous V waves present [No Jugular Venous Ford A Waves] : no jugular venous ford A waves [Heart Rate And Rhythm] : heart rate and rhythm were normal [Heart Sounds] : normal S1 and S2 [Murmurs] : no murmurs present [Respiration, Rhythm And Depth] : normal respiratory rhythm and effort [Exaggerated Use Of Accessory Muscles For Inspiration] : no accessory muscle use [Auscultation Breath Sounds / Voice Sounds] : lungs were clear to auscultation bilaterally [Abdomen Soft] : soft [Abdomen Tenderness] : non-tender [Abdomen Mass (___ Cm)] : no abdominal mass palpated [Abnormal Walk] : normal gait [Gait - Sufficient For Exercise Testing] : the gait was sufficient for exercise testing [Nail Clubbing] : no clubbing of the fingernails [Cyanosis, Localized] : no localized cyanosis [Petechial Hemorrhages (___cm)] : no petechial hemorrhages [Skin Color & Pigmentation] : normal skin color and pigmentation [] : no rash [No Venous Stasis] : no venous stasis [Skin Lesions] : no skin lesions [No Skin Ulcers] : no skin ulcer [No Xanthoma] : no  xanthoma was observed [Oriented To Time, Place, And Person] : oriented to person, place, and time [Affect] : the affect was normal [Mood] : the mood was normal [No Anxiety] : not feeling anxious

## 2021-11-11 LAB
25(OH)D3 SERPL-MCNC: 19.3 NG/ML
ALBUMIN SERPL ELPH-MCNC: 4.4 G/DL
ALP BLD-CCNC: 102 U/L
ALT SERPL-CCNC: 25 U/L
ANION GAP SERPL CALC-SCNC: 13 MMOL/L
AST SERPL-CCNC: 18 U/L
BASOPHILS # BLD AUTO: 0.04 K/UL
BASOPHILS NFR BLD AUTO: 0.4 %
BILIRUB SERPL-MCNC: 0.4 MG/DL
BUN SERPL-MCNC: 14 MG/DL
CALCIUM SERPL-MCNC: 9.4 MG/DL
CHLORIDE SERPL-SCNC: 105 MMOL/L
CHOLEST SERPL-MCNC: 229 MG/DL
CO2 SERPL-SCNC: 24 MMOL/L
CREAT SERPL-MCNC: 1.08 MG/DL
EOSINOPHIL # BLD AUTO: 0.08 K/UL
EOSINOPHIL NFR BLD AUTO: 0.9 %
ESTIMATED AVERAGE GLUCOSE: 103 MG/DL
GLUCOSE SERPL-MCNC: 97 MG/DL
HBA1C MFR BLD HPLC: 5.2 %
HCT VFR BLD CALC: 45.4 %
HDLC SERPL-MCNC: 59 MG/DL
HGB BLD-MCNC: 14.7 G/DL
IMM GRANULOCYTES NFR BLD AUTO: 0.3 %
LDLC SERPL CALC-MCNC: 138 MG/DL
LYMPHOCYTES # BLD AUTO: 2.23 K/UL
LYMPHOCYTES NFR BLD AUTO: 24.3 %
MAN DIFF?: NORMAL
MCHC RBC-ENTMCNC: 30.1 PG
MCHC RBC-ENTMCNC: 32.4 GM/DL
MCV RBC AUTO: 92.8 FL
MONOCYTES # BLD AUTO: 0.69 K/UL
MONOCYTES NFR BLD AUTO: 7.5 %
NEUTROPHILS # BLD AUTO: 6.1 K/UL
NEUTROPHILS NFR BLD AUTO: 66.6 %
NONHDLC SERPL-MCNC: 169 MG/DL
NT-PROBNP SERPL-MCNC: 34 PG/ML
PLATELET # BLD AUTO: 216 K/UL
POTASSIUM SERPL-SCNC: 3.9 MMOL/L
PROT SERPL-MCNC: 7.1 G/DL
RBC # BLD: 4.89 M/UL
RBC # FLD: 14.6 %
SODIUM SERPL-SCNC: 141 MMOL/L
TRIGL SERPL-MCNC: 155 MG/DL
WBC # FLD AUTO: 9.17 K/UL

## 2021-11-11 RX ORDER — ERGOCALCIFEROL 1.25 MG/1
1.25 MG CAPSULE, LIQUID FILLED ORAL
Qty: 12 | Refills: 1 | Status: ACTIVE | COMMUNITY
Start: 2021-11-11 | End: 1900-01-01

## 2021-12-01 ENCOUNTER — APPOINTMENT (OUTPATIENT)
Dept: CARDIOLOGY | Facility: CLINIC | Age: 41
End: 2021-12-01

## 2022-06-07 ENCOUNTER — RX RENEWAL (OUTPATIENT)
Age: 42
End: 2022-06-07

## 2022-06-27 NOTE — ED PROVIDER NOTE - NS ED MD DISPO ISOLATION TYPES
- on ozempic 0.25mg weekly, basaglar 15mg  and metformin 1000mg bid at home  - a1c 7.9, poorly controlled   - C/w  lantus to 30 units  - C/w dmelog 5 units TID before meals  - FS with SS None

## 2022-12-27 ENCOUNTER — APPOINTMENT (OUTPATIENT)
Dept: CARDIOLOGY | Facility: CLINIC | Age: 42
End: 2022-12-27
Payer: COMMERCIAL

## 2022-12-27 ENCOUNTER — NON-APPOINTMENT (OUTPATIENT)
Age: 42
End: 2022-12-27

## 2022-12-27 ENCOUNTER — APPOINTMENT (OUTPATIENT)
Dept: PULMONOLOGY | Facility: CLINIC | Age: 42
End: 2022-12-27
Payer: COMMERCIAL

## 2022-12-27 VITALS
HEIGHT: 69 IN | SYSTOLIC BLOOD PRESSURE: 120 MMHG | TEMPERATURE: 98 F | OXYGEN SATURATION: 97 % | DIASTOLIC BLOOD PRESSURE: 90 MMHG | WEIGHT: 256.01 LBS | HEART RATE: 85 BPM | BODY MASS INDEX: 37.92 KG/M2

## 2022-12-27 DIAGNOSIS — Z12.5 ENCOUNTER FOR SCREENING FOR MALIGNANT NEOPLASM OF PROSTATE: ICD-10-CM

## 2022-12-27 DIAGNOSIS — Z00.00 ENCOUNTER FOR GENERAL ADULT MEDICAL EXAMINATION W/OUT ABNORMAL FINDINGS: ICD-10-CM

## 2022-12-27 DIAGNOSIS — Z12.83 ENCOUNTER FOR SCREENING FOR MALIGNANT NEOPLASM OF SKIN: ICD-10-CM

## 2022-12-27 DIAGNOSIS — R42 DIZZINESS AND GIDDINESS: ICD-10-CM

## 2022-12-27 DIAGNOSIS — Z78.9 OTHER SPECIFIED HEALTH STATUS: ICD-10-CM

## 2022-12-27 DIAGNOSIS — Z83.3 FAMILY HISTORY OF DIABETES MELLITUS: ICD-10-CM

## 2022-12-27 DIAGNOSIS — Z13.228 ENCOUNTER FOR SCREENING FOR OTHER METABOLIC DISORDERS: ICD-10-CM

## 2022-12-27 DIAGNOSIS — F17.290 NICOTINE DEPENDENCE, OTHER TOBACCO PRODUCT, UNCOMPLICATED: ICD-10-CM

## 2022-12-27 DIAGNOSIS — Z86.16 PERSONAL HISTORY OF COVID-19: ICD-10-CM

## 2022-12-27 PROCEDURE — 93000 ELECTROCARDIOGRAM COMPLETE: CPT | Mod: 59

## 2022-12-27 PROCEDURE — 93306 TTE W/DOPPLER COMPLETE: CPT

## 2022-12-27 PROCEDURE — 99396 PREV VISIT EST AGE 40-64: CPT | Mod: 25

## 2022-12-27 PROCEDURE — 71046 X-RAY EXAM CHEST 2 VIEWS: CPT

## 2022-12-27 NOTE — HISTORY OF PRESENT ILLNESS
[FreeTextEntry1] : This is a 41 y/o male here to establish cardiac and primary care. Patient reports covid infection last year and was hospitalized, was given MAB, not on ventilator. Patient reports since then he has been experiencing intermittent left sided chest discomfort with no radiation which lasts a few seconds and occurs a few times a week, no aggravating or alleviating factors and resolves spontaneously. 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  He also reports pain in his right lung. PAtient admits to dyspnea which occurs with exertion. Patient reports rare dizziness.  He denies palpitations, syncope

## 2022-12-27 NOTE — PHYSICAL EXAM
[Well Developed] : well developed [Well Nourished] : well nourished [No Acute Distress] : no acute distress [Normal Conjunctiva] : normal conjunctiva [Normal Venous Pressure] : normal venous pressure [No Carotid Bruit] : no carotid bruit [Normal S1, S2] : normal S1, S2 [No Murmur] : no murmur [No Rub] : no rub [No Gallop] : no gallop [Clear Lung Fields] : clear lung fields [Good Air Entry] : good air entry [No Respiratory Distress] : no respiratory distress  [Soft] : abdomen soft [Non Tender] : non-tender [No Masses/organomegaly] : no masses/organomegaly [Normal Bowel Sounds] : normal bowel sounds [Normal Gait] : normal gait [No Edema] : no edema [No Cyanosis] : no cyanosis [No Clubbing] : no clubbing [No Varicosities] : no varicosities [No Rash] : no rash [No Skin Lesions] : no skin lesions [Moves all extremities] : moves all extremities [No Focal Deficits] : no focal deficits [Normal Speech] : normal speech [Alert and Oriented] : alert and oriented [Normal memory] : normal memory [de-identified] : cyst right testicle and prosttate normal rest of genitals normal  [de-identified] : nevi

## 2023-01-05 ENCOUNTER — APPOINTMENT (OUTPATIENT)
Dept: PULMONOLOGY | Facility: CLINIC | Age: 43
End: 2023-01-05
Payer: COMMERCIAL

## 2023-01-05 ENCOUNTER — APPOINTMENT (OUTPATIENT)
Dept: PULMONOLOGY | Facility: CLINIC | Age: 43
End: 2023-01-05

## 2023-01-05 VITALS — HEART RATE: 98 BPM | DIASTOLIC BLOOD PRESSURE: 91 MMHG | OXYGEN SATURATION: 99 % | SYSTOLIC BLOOD PRESSURE: 135 MMHG

## 2023-01-05 DIAGNOSIS — U07.1 COVID-19: ICD-10-CM

## 2023-01-05 PROCEDURE — 94010 BREATHING CAPACITY TEST: CPT

## 2023-01-05 PROCEDURE — 88738 HGB QUANT TRANSCUTANEOUS: CPT

## 2023-01-05 PROCEDURE — 94729 DIFFUSING CAPACITY: CPT

## 2023-01-05 PROCEDURE — 94727 GAS DIL/WSHOT DETER LNG VOL: CPT

## 2023-01-05 PROCEDURE — 71046 X-RAY EXAM CHEST 2 VIEWS: CPT

## 2023-01-05 PROCEDURE — 99204 OFFICE O/P NEW MOD 45 MIN: CPT | Mod: 25

## 2023-01-07 NOTE — PROCEDURE
[FreeTextEntry1] : Pulmonary Function Test performed in my office today: Spirometry: Within normal limits; Lung Volume: Within normal limits; Diffusion: Within normal limits.\par \par \par  Exhaled Nitric Oxide             Final\par \par No Documents Attached\par \par \par   Test   Result   Flag Reference Goal Last Verified \par   Exhaled Nitric Oxide 14.2      REQUIRED \par \par  Ordered by: LEANA GILMORE       Collected/Examined: 05Jan2023 03:33PM       \par Verification Required       Stage: Final       \par  Performed at: In Office       Performed by: MANDY BREWSTER       Resulted: 05Jan2023 03:33PM       Last Updated: 05Jan2023 03:34PM       \par ___________\par  Xray Chest 2 Views PA/Lat             Final\par \par No Documents Attached\par \par \par \par \par   \par Chest x-ray PA and lateral views performed in my office today showed clear lungs, no evidence of infiltrates or pleural effusions. \par \par \par  Ordered by: LEANA GILMORE       Collected/Examined: [id number] 03:06PM       \par Verification Required       Stage: Final       \par  Performed at: In Office       Performed by: BECKA PLEITEZ       Resulted: [id number] 03:06PM       Last Updated: [id number] 03:07PM

## 2023-01-07 NOTE — HISTORY OF PRESENT ILLNESS
[TextBox_4] : Ady is a pleasant 42-year-old gentleman without significant past medical history, he had COVID-19 pneumonia in August 2021, had remdesivir and dexamethasone then.  He had recurrent COVID-19 infection in August 2022.  He came in complaining of shortness of breath and right-sided atypical chest pain for the last 2 months, but no cough, fever or chills.\par He smokes hookah on weekends, he does not smoke cigarette, no history of illicit drug usage.\par Referred by Dr. Cruz for pulmonary consultation for chest pain and shortness of breath.

## 2023-01-07 NOTE — CONSULT LETTER
[Dear  ___] : Dear  [unfilled], [Courtesy Letter:] : I had the pleasure of seeing your patient, [unfilled], in my office today. [Please see my note below.] : Please see my note below. [Consult Closing:] : Thank you very much for allowing me to participate in the care of this patient.  If you have any questions, please do not hesitate to contact me. [Sincerely,] : Sincerely, [FreeTextEntry3] : Dr. Magdalene Goyal

## 2023-01-07 NOTE — ASSESSMENT
[FreeTextEntry1] : Venipuncture with labs drawn in office\par Starting him on Z-Caesar and prednisone taper.\par Advised him to return for follow-up in 2 weeks.\par Advised him on increasing aerobic exercise for better physical conditioning.

## 2023-01-07 NOTE — DISCUSSION/SUMMARY
[FreeTextEntry1] : Brendan is a patient with atypical chest pain and shortness of breath, likely secondary to bronchitis from COVID-19 infection, could have an element of physical deconditioning/long COVID.\par

## 2023-01-08 LAB
ALBUMIN SERPL ELPH-MCNC: 4.4 G/DL
ALP BLD-CCNC: 115 U/L
ALT SERPL-CCNC: 37 U/L
ANION GAP SERPL CALC-SCNC: 15 MMOL/L
AST SERPL-CCNC: 21 U/L
BASOPHILS # BLD AUTO: 0.04 K/UL
BASOPHILS NFR BLD AUTO: 0.4 %
BILIRUB SERPL-MCNC: 0.4 MG/DL
BUN SERPL-MCNC: 16 MG/DL
CALCIUM SERPL-MCNC: 9.8 MG/DL
CHLORIDE SERPL-SCNC: 105 MMOL/L
CO2 SERPL-SCNC: 22 MMOL/L
CREAT SERPL-MCNC: 1.04 MG/DL
CRP SERPL-MCNC: 12 MG/L
DEPRECATED D DIMER PPP IA-ACNC: <150 NG/ML DDU
EGFR: 91 ML/MIN/1.73M2
EOSINOPHIL # BLD AUTO: 0.15 K/UL
EOSINOPHIL NFR BLD AUTO: 1.7 %
FERRITIN SERPL-MCNC: 70 NG/ML
GLUCOSE SERPL-MCNC: 94 MG/DL
HCT VFR BLD CALC: 46 %
HGB BLD-MCNC: 15.2 G/DL
IMM GRANULOCYTES NFR BLD AUTO: 0.3 %
LYMPHOCYTES # BLD AUTO: 1.78 K/UL
LYMPHOCYTES NFR BLD AUTO: 19.8 %
MAN DIFF?: NORMAL
MCHC RBC-ENTMCNC: 29.9 PG
MCHC RBC-ENTMCNC: 33 GM/DL
MCV RBC AUTO: 90.6 FL
MONOCYTES # BLD AUTO: 0.71 K/UL
MONOCYTES NFR BLD AUTO: 7.9 %
NEUTROPHILS # BLD AUTO: 6.3 K/UL
NEUTROPHILS NFR BLD AUTO: 69.9 %
PLATELET # BLD AUTO: 227 K/UL
POTASSIUM SERPL-SCNC: 4.2 MMOL/L
PROCALCITONIN SERPL-MCNC: 0.05 NG/ML
PROT SERPL-MCNC: 7.2 G/DL
RBC # BLD: 5.08 M/UL
RBC # FLD: 13.7 %
SODIUM SERPL-SCNC: 141 MMOL/L
WBC # FLD AUTO: 9.01 K/UL

## 2023-01-09 ENCOUNTER — APPOINTMENT (OUTPATIENT)
Dept: CARDIOLOGY | Facility: CLINIC | Age: 43
End: 2023-01-09
Payer: COMMERCIAL

## 2023-01-09 ENCOUNTER — APPOINTMENT (OUTPATIENT)
Dept: GASTROENTEROLOGY | Facility: CLINIC | Age: 43
End: 2023-01-09
Payer: COMMERCIAL

## 2023-01-09 VITALS
SYSTOLIC BLOOD PRESSURE: 122 MMHG | WEIGHT: 258 LBS | DIASTOLIC BLOOD PRESSURE: 82 MMHG | BODY MASS INDEX: 38.21 KG/M2 | OXYGEN SATURATION: 96 % | HEART RATE: 98 BPM | TEMPERATURE: 98 F | HEIGHT: 69 IN | RESPIRATION RATE: 12 BRPM

## 2023-01-09 DIAGNOSIS — R79.89 OTHER SPECIFIED ABNORMAL FINDINGS OF BLOOD CHEMISTRY: ICD-10-CM

## 2023-01-09 DIAGNOSIS — Z12.11 ENCOUNTER FOR SCREENING FOR MALIGNANT NEOPLASM OF COLON: ICD-10-CM

## 2023-01-09 DIAGNOSIS — Z71.85 ENCOUNTER FOR IMMUNIZATION SAFETY COUNSELING: ICD-10-CM

## 2023-01-09 PROCEDURE — 93015 CV STRESS TEST SUPVJ I&R: CPT

## 2023-01-09 PROCEDURE — 99203 OFFICE O/P NEW LOW 30 MIN: CPT

## 2023-01-09 RX ORDER — AZITHROMYCIN 250 MG/1
250 TABLET, FILM COATED ORAL
Qty: 1 | Refills: 0 | Status: DISCONTINUED | COMMUNITY
Start: 2023-01-05 | End: 2023-01-09

## 2023-01-09 NOTE — HISTORY OF PRESENT ILLNESS
[FreeTextEntry1] : 41 yo male, hx of colonic polyp 2018 for surveillance colonoscopy. Has occasional gerd, Had stress test today, results pending. NO sob or cp. Feels well, has physical job, but not excercising.No family hx of colon cancer.

## 2023-01-09 NOTE — ASSESSMENT
[FreeTextEntry1] : 41 yo male, hx of colonic polyp 2018 for surveillance colonoscopy. Has occasional gerd, Had stress test today, results pending. NO sob or cp. Feels well, has physical job, but not exercising , no family hx of colon cancer.\par \par IMP:\par 1. hx of colon polyp\par 2. colon cancer screenign\par 3. Obesity\par 4. minimal and episodic gerd\par \par PLAN:\par \par He was advised to undergo colonoscopy to which he  agreed. The procedure will be performed in [facility] with the assistance of an anesthesiologist. The procedure was explained in detail and he understood the risks of the procedure not limited  to infection, bleeding, perforation, risk of anesthesia and risk of IV site problems,emergency surgery, missed lesions  or non-diagnosis of colon cancer. He  was advised that he could not drive home alone, if the patient chooses to receive sedation. Further diagnostic and treatment recommendations will be based upon the procedure and any biopsies, if they are taken.\par \par exercise and weight loss encouraged

## 2023-01-09 NOTE — CONSULT LETTER
[Dear  ___] : Dear  [unfilled], [Consult Letter:] : I had the pleasure of evaluating your patient, [unfilled]. [Please see my note below.] : Please see my note below. [Consult Closing:] : Thank you very much for allowing me to participate in the care of this patient.  If you have any questions, please do not hesitate to contact me. [Sincerely,] : Sincerely, [FreeTextEntry3] : Ward Kingston MD, FACP, AGAF, FAASLD\par  of Medicine\par [facility]\par

## 2023-01-09 NOTE — PHYSICAL EXAM
[Alert] : alert [Normal Voice/Communication] : normal voice/communication [Healthy Appearing] : healthy appearing [No Acute Distress] : no acute distress [Sclera] : the sclera and conjunctiva were normal [Hearing Threshold Finger Rub Not [place]] : hearing was normal [Normal Lips/Gums] : the lips and gums were normal [Oropharynx] : the oropharynx was normal [Normal Appearance] : the appearance of the neck was normal [No Neck Mass] : no neck mass was observed [No Respiratory Distress] : no respiratory distress [No Acc Muscle Use] : no accessory muscle use [Respiration, Rhythm And Depth] : normal respiratory rhythm and effort [Auscultation Breath Sounds / Voice Sounds] : lungs were clear to auscultation bilaterally [Heart Rate And Rhythm] : heart rate was normal and rhythm regular [Normal S1, S2] : normal S1 and S2 [Murmurs] : no murmurs [Bowel Sounds] : normal bowel sounds [Abdomen Tenderness] : non-tender [No Masses] : no abdominal mass palpated [Abdomen Soft] : soft [] : no hepatosplenomegaly [Oriented To Time, Place, And Person] : oriented to person, place, and time

## 2023-01-13 ENCOUNTER — APPOINTMENT (OUTPATIENT)
Dept: ENDOCRINOLOGY | Facility: CLINIC | Age: 43
End: 2023-01-13
Payer: COMMERCIAL

## 2023-01-13 VITALS
RESPIRATION RATE: 14 BRPM | BODY MASS INDEX: 38.21 KG/M2 | HEART RATE: 94 BPM | HEIGHT: 69 IN | DIASTOLIC BLOOD PRESSURE: 70 MMHG | OXYGEN SATURATION: 97 % | TEMPERATURE: 98.4 F | WEIGHT: 258 LBS | SYSTOLIC BLOOD PRESSURE: 126 MMHG

## 2023-01-13 PROCEDURE — 99204 OFFICE O/P NEW MOD 45 MIN: CPT

## 2023-01-13 NOTE — PHYSICAL EXAM
[Alert] : alert [Well Nourished] : well nourished [No Acute Distress] : no acute distress [Well Developed] : well developed [Normal Sclera/Conjunctiva] : normal sclera/conjunctiva [EOMI] : extra ocular movement intact [No Proptosis] : no proptosis [Normal Oropharynx] : the oropharynx was normal [Thyroid Not Enlarged] : the thyroid was not enlarged [No Respiratory Distress] : no respiratory distress [No Accessory Muscle Use] : no accessory muscle use [Clear to Auscultation] : lungs were clear to auscultation bilaterally [Normal S1, S2] : normal S1 and S2 [Normal Rate] : heart rate was normal [Regular Rhythm] : with a regular rhythm [No Edema] : no peripheral edema [Pedal Pulses Normal] : the pedal pulses are present [Normal Bowel Sounds] : normal bowel sounds [Not Tender] : non-tender [Not Distended] : not distended [Soft] : abdomen soft [Normal Anterior Cervical Nodes] : no anterior cervical lymphadenopathy [Normal Posterior Cervical Nodes] : no posterior cervical lymphadenopathy [No Spinal Tenderness] : no spinal tenderness [Spine Straight] : spine straight [No Stigmata of Cushings Syndrome] : no stigmata of Cushings Syndrome [Normal Gait] : normal gait [Normal Strength/Tone] : muscle strength and tone were normal [No Rash] : no rash [Normal Reflexes] : deep tendon reflexes were 2+ and symmetric [No Tremors] : no tremors [Oriented x3] : oriented to person, place, and time [Acanthosis Nigricans] : no acanthosis nigricans

## 2023-01-13 NOTE — HISTORY OF PRESENT ILLNESS
[FreeTextEntry1] : 42 year M here for assessment of obesity \par \par Follows a special diet: None\par Tried to lose weight before: Yes  \par Tried any diet plans/ meal replacements for weight control: No \par Tried OTC or prescription medication for weight control: ? phentermine in the past \par Activity level: typically light activity with no scheduled physical activity\par Ease of skin bruising: No\par Proximal muscle weakness: No\par Prior weight loss surgery: No\par \par \par History of the following:  \par Thyroid disease: No\par Heart disease: No \par Mood disorder: No \par Hypertension: No\par Seizures: No\par

## 2023-02-06 NOTE — DATA REVIEWED
[FreeTextEntry1] : EKG reviewed, nSR, WNL\par \par All inpt records including labs and diagnostic studies reviewed. Elevated LFTs (alk phos, ALT AST, which came down during hospitalization although not back to normal. D-dimer initially elevated and then normalized.
no

## 2023-02-17 ENCOUNTER — APPOINTMENT (OUTPATIENT)
Dept: ENDOCRINOLOGY | Facility: CLINIC | Age: 43
End: 2023-02-17

## 2023-02-21 ENCOUNTER — APPOINTMENT (OUTPATIENT)
Dept: CARDIOLOGY | Facility: CLINIC | Age: 43
End: 2023-02-21
Payer: COMMERCIAL

## 2023-02-21 ENCOUNTER — APPOINTMENT (OUTPATIENT)
Dept: ENDOCRINOLOGY | Facility: CLINIC | Age: 43
End: 2023-02-21
Payer: COMMERCIAL

## 2023-02-21 ENCOUNTER — NON-APPOINTMENT (OUTPATIENT)
Age: 43
End: 2023-02-21

## 2023-02-21 ENCOUNTER — APPOINTMENT (OUTPATIENT)
Dept: PULMONOLOGY | Facility: CLINIC | Age: 43
End: 2023-02-21
Payer: COMMERCIAL

## 2023-02-21 VITALS — HEART RATE: 116 BPM | DIASTOLIC BLOOD PRESSURE: 80 MMHG | OXYGEN SATURATION: 96 % | SYSTOLIC BLOOD PRESSURE: 130 MMHG

## 2023-02-21 VITALS
HEART RATE: 88 BPM | HEIGHT: 69 IN | OXYGEN SATURATION: 97 % | BODY MASS INDEX: 37.33 KG/M2 | TEMPERATURE: 98.1 F | DIASTOLIC BLOOD PRESSURE: 90 MMHG | WEIGHT: 252 LBS | SYSTOLIC BLOOD PRESSURE: 130 MMHG

## 2023-02-21 VITALS
SYSTOLIC BLOOD PRESSURE: 136 MMHG | HEIGHT: 69 IN | OXYGEN SATURATION: 96 % | RESPIRATION RATE: 22 BRPM | HEART RATE: 98 BPM | DIASTOLIC BLOOD PRESSURE: 92 MMHG | TEMPERATURE: 97.7 F | WEIGHT: 250 LBS | BODY MASS INDEX: 37.03 KG/M2

## 2023-02-21 DIAGNOSIS — R79.89 OTHER SPECIFIED ABNORMAL FINDINGS OF BLOOD CHEMISTRY: ICD-10-CM

## 2023-02-21 DIAGNOSIS — E78.00 PURE HYPERCHOLESTEROLEMIA, UNSPECIFIED: ICD-10-CM

## 2023-02-21 DIAGNOSIS — E66.3 OVERWEIGHT: ICD-10-CM

## 2023-02-21 DIAGNOSIS — R07.89 OTHER CHEST PAIN: ICD-10-CM

## 2023-02-21 DIAGNOSIS — E66.9 OVERWEIGHT: ICD-10-CM

## 2023-02-21 DIAGNOSIS — R03.0 ELEVATED BLOOD-PRESSURE READING, W/OUT DIAGNOSIS OF HYPERTENSION: ICD-10-CM

## 2023-02-21 DIAGNOSIS — R06.09 OTHER FORMS OF DYSPNEA: ICD-10-CM

## 2023-02-21 DIAGNOSIS — N44.2 BENIGN CYST OF TESTIS: ICD-10-CM

## 2023-02-21 PROCEDURE — 93000 ELECTROCARDIOGRAM COMPLETE: CPT

## 2023-02-21 PROCEDURE — 99213 OFFICE O/P EST LOW 20 MIN: CPT | Mod: 25

## 2023-02-21 PROCEDURE — 99214 OFFICE O/P EST MOD 30 MIN: CPT

## 2023-02-21 PROCEDURE — 99214 OFFICE O/P EST MOD 30 MIN: CPT | Mod: 25

## 2023-02-21 PROCEDURE — 94010 BREATHING CAPACITY TEST: CPT

## 2023-02-21 NOTE — DISCUSSION/SUMMARY
[FreeTextEntry1] : Mr. LEIGH MATT is an 43 year old male, history of significant past medical history. Patient has dyspnea likely secondary to physical deconditioning from recent weight gain and obesity.

## 2023-02-21 NOTE — ASSESSMENT
[FreeTextEntry1] : Obtained and reviewed pulmonary function test results with patient today. \par Advised him on increasing aerobic exercise for better physical conditioning.\par Advised him to return for follow-up in 3 months.

## 2023-02-21 NOTE — HISTORY OF PRESENT ILLNESS
[FreeTextEntry1] : 42 year M here for f/up of obesity \par \par Started on wegovy 0.25mg Q weekly at last visit. No nausea, no vomiting, no abdominal pain\par Reports recently aware that insurance not covering going forward. \par \par \par Tried OTC or prescription medication for weight control: ? phentermine in the past however no success

## 2023-02-21 NOTE — HISTORY OF PRESENT ILLNESS
[TextBox_4] : LEIGH MATT is a 43 year old male, without significant past medical history, who presents to the office for follow up evaluation. Patient reports that he is feeling well overall. He states of having mild dyspnea on exertion when walking up stairs and having to occasionally take deep breaths. Patient reports that he has gained about 15 lbs in the past 1-2 years.

## 2023-02-21 NOTE — ADDENDUM
[FreeTextEntry1] : I, Mimi Pintolisa, acted solely as a scribe for Dr. Magdalene Goyal D.O., on this date 02/21/2023. \par \par All medical record entries made by the Scribe were at my, Dr. Magdalene Goyal D.O., direction and personally dictated by me on 02/21/2023. I have reviewed the chart and agree that the record accurately reflects my personal performance of the history, physical exam, assessment and plan. I have also personally directed, reviewed, and agreed with the chart.

## 2023-02-21 NOTE — PHYSICAL EXAM
[Alert] : alert [Well Nourished] : well nourished [Obese] : obese [No Acute Distress] : no acute distress [Well Developed] : well developed [Normal Sclera/Conjunctiva] : normal sclera/conjunctiva [EOMI] : extra ocular movement intact [No Proptosis] : no proptosis [Normal Oropharynx] : the oropharynx was normal [Thyroid Not Enlarged] : the thyroid was not enlarged [No Respiratory Distress] : no respiratory distress [No Accessory Muscle Use] : no accessory muscle use [Clear to Auscultation] : lungs were clear to auscultation bilaterally [Normal S1, S2] : normal S1 and S2 [Normal Rate] : heart rate was normal [Regular Rhythm] : with a regular rhythm [No Edema] : no peripheral edema [Pedal Pulses Normal] : the pedal pulses are present [Normal Bowel Sounds] : normal bowel sounds [Not Tender] : non-tender [Not Distended] : not distended [Soft] : abdomen soft [Normal Anterior Cervical Nodes] : no anterior cervical lymphadenopathy [No Spinal Tenderness] : no spinal tenderness [Spine Straight] : spine straight [No Stigmata of Cushings Syndrome] : no stigmata of Cushings Syndrome [Normal Gait] : normal gait [Normal Strength/Tone] : muscle strength and tone were normal [No Rash] : no rash [Acanthosis Nigricans] : no acanthosis nigricans [Normal Reflexes] : deep tendon reflexes were 2+ and symmetric [No Tremors] : no tremors [Oriented x3] : oriented to person, place, and time

## 2023-02-21 NOTE — HISTORY OF PRESENT ILLNESS
[FreeTextEntry1] : This is a 44 y/o male with a pmhx of vitamin D deficiency here today for a follow up. Pt was last seen in the office on 12/27/22 to establish cardiac care c/o atypical chest pain and dyspnea. He had echo 1/2023 which demonstrated normal EF and normal diastolic function. He is s/p normal EST 1/2023 pt reports overall feeling well continues to feeling TIMMONS when walking up a flight of stairs denies any chest pain

## 2023-02-23 DIAGNOSIS — E66.9 OBESITY, UNSPECIFIED: ICD-10-CM

## 2023-02-23 RX ORDER — SEMAGLUTIDE 1.34 MG/ML
2 INJECTION, SOLUTION SUBCUTANEOUS
Qty: 1 | Refills: 1 | Status: ACTIVE | COMMUNITY
Start: 2023-02-23 | End: 1900-01-01

## 2023-02-23 RX ORDER — SEMAGLUTIDE 0.5 MG/.5ML
0.5 INJECTION, SOLUTION SUBCUTANEOUS
Qty: 4 | Refills: 1 | Status: DISCONTINUED | COMMUNITY
Start: 2023-01-13 | End: 2023-02-23

## 2023-02-24 ENCOUNTER — NON-APPOINTMENT (OUTPATIENT)
Age: 43
End: 2023-02-24

## 2023-03-09 RX ORDER — POLYETHYLENE GLYCOL-3350 AND ELECTROLYTES WITH FLAVOR PACK 240; 5.84; 2.98; 6.72; 22.72 G/278.26G; G/278.26G; G/278.26G; G/278.26G; G/278.26G
240 POWDER, FOR SOLUTION ORAL
Qty: 1 | Refills: 0 | Status: ACTIVE | COMMUNITY
Start: 2023-01-26 | End: 1900-01-01

## 2023-03-13 ENCOUNTER — APPOINTMENT (OUTPATIENT)
Dept: GASTROENTEROLOGY | Facility: AMBULATORY SURGERY CENTER | Age: 43
End: 2023-03-13
Payer: COMMERCIAL

## 2023-03-13 ENCOUNTER — RESULT REVIEW (OUTPATIENT)
Age: 43
End: 2023-03-13

## 2023-03-13 PROCEDURE — 45380 COLONOSCOPY AND BIOPSY: CPT | Mod: 33

## 2023-03-21 ENCOUNTER — APPOINTMENT (OUTPATIENT)
Dept: DERMATOLOGY | Facility: CLINIC | Age: 43
End: 2023-03-21
Payer: COMMERCIAL

## 2023-03-21 ENCOUNTER — NON-APPOINTMENT (OUTPATIENT)
Age: 43
End: 2023-03-21

## 2023-03-21 DIAGNOSIS — Z12.83 ENCOUNTER FOR SCREENING FOR MALIGNANT NEOPLASM OF SKIN: ICD-10-CM

## 2023-03-21 DIAGNOSIS — D22.9 MELANOCYTIC NEVI, UNSPECIFIED: ICD-10-CM

## 2023-03-21 DIAGNOSIS — L91.8 OTHER HYPERTROPHIC DISORDERS OF THE SKIN: ICD-10-CM

## 2023-03-21 PROCEDURE — 99203 OFFICE O/P NEW LOW 30 MIN: CPT

## 2023-04-14 ENCOUNTER — APPOINTMENT (OUTPATIENT)
Dept: CARDIOLOGY | Facility: CLINIC | Age: 43
End: 2023-04-14

## 2023-05-24 RX ORDER — PREDNISONE 10 MG/1
10 TABLET ORAL
Qty: 18 | Refills: 0 | Status: COMPLETED | COMMUNITY
Start: 2023-01-05 | End: 2023-05-24

## 2023-05-26 ENCOUNTER — APPOINTMENT (OUTPATIENT)
Dept: PULMONOLOGY | Facility: CLINIC | Age: 43
End: 2023-05-26

## 2023-12-04 ENCOUNTER — APPOINTMENT (OUTPATIENT)
Dept: CARDIOLOGY | Facility: CLINIC | Age: 43
End: 2023-12-04

## 2024-05-06 LAB
25(OH)D3 SERPL-MCNC: 24.6 NG/ML
ALBUMIN SERPL ELPH-MCNC: 4.3 G/DL
ALP BLD-CCNC: 110 U/L
ALT SERPL-CCNC: 31 U/L
ANION GAP SERPL CALC-SCNC: 12 MMOL/L
APPEARANCE: CLEAR
AST SERPL-CCNC: 21 U/L
BACTERIA: NEGATIVE
BILIRUB SERPL-MCNC: 0.6 MG/DL
BILIRUBIN URINE: NEGATIVE
BLOOD URINE: NEGATIVE
BUN SERPL-MCNC: 17 MG/DL
CALCIUM SERPL-MCNC: 9.6 MG/DL
CHLORIDE SERPL-SCNC: 103 MMOL/L
CHOLEST SERPL-MCNC: 219 MG/DL
CO2 SERPL-SCNC: 27 MMOL/L
COLOR: NORMAL
CREAT SERPL-MCNC: 1 MG/DL
EGFR: 95 ML/MIN/1.73M2
ESTIMATED AVERAGE GLUCOSE: 111 MG/DL
GLUCOSE QUALITATIVE U: NEGATIVE
GLUCOSE SERPL-MCNC: 87 MG/DL
HBA1C MFR BLD HPLC: 5.5 %
HDLC SERPL-MCNC: 59 MG/DL
HYALINE CASTS: 1 /LPF
KETONES URINE: NEGATIVE
LDLC SERPL CALC-MCNC: 123 MG/DL
LEUKOCYTE ESTERASE URINE: NEGATIVE
MAGNESIUM SERPL-MCNC: 2.1 MG/DL
MICROSCOPIC-UA: NORMAL
NITRITE URINE: NEGATIVE
NONHDLC SERPL-MCNC: 159 MG/DL
PH URINE: 6
PHOSPHATE SERPL-MCNC: 3 MG/DL
POTASSIUM SERPL-SCNC: 4.2 MMOL/L
PROT SERPL-MCNC: 7.6 G/DL
PROTEIN URINE: NORMAL
PSA SERPL-MCNC: 0.56 NG/ML
RED BLOOD CELLS URINE: 2 /HPF
SODIUM SERPL-SCNC: 142 MMOL/L
SPECIFIC GRAVITY URINE: 1.02
SQUAMOUS EPITHELIAL CELLS: 0 /HPF
T4 FREE SERPL-MCNC: 0.9 NG/DL
TRIGL SERPL-MCNC: 183 MG/DL
TSH SERPL-ACNC: 2.64 UIU/ML
URATE SERPL-MCNC: 6.9 MG/DL
UROBILINOGEN URINE: NORMAL
WHITE BLOOD CELLS URINE: 2 /HPF